# Patient Record
Sex: FEMALE | Race: WHITE | NOT HISPANIC OR LATINO | URBAN - METROPOLITAN AREA
[De-identification: names, ages, dates, MRNs, and addresses within clinical notes are randomized per-mention and may not be internally consistent; named-entity substitution may affect disease eponyms.]

---

## 2019-01-08 ENCOUNTER — INPATIENT (INPATIENT)
Facility: HOSPITAL | Age: 84
LOS: 1 days | Discharge: NURSING, REHAB OR LONG-TERM CARE | End: 2019-01-10
Attending: SURGERY | Admitting: SURGERY
Payer: MEDICARE

## 2019-01-08 VITALS
OXYGEN SATURATION: 97 % | HEART RATE: 74 BPM | SYSTOLIC BLOOD PRESSURE: 180 MMHG | DIASTOLIC BLOOD PRESSURE: 78 MMHG | RESPIRATION RATE: 18 BRPM | TEMPERATURE: 98 F

## 2019-01-08 LAB
ALBUMIN SERPL ELPH-MCNC: 4.5 G/DL — SIGNIFICANT CHANGE UP (ref 3.5–5.2)
ALP SERPL-CCNC: 84 U/L — SIGNIFICANT CHANGE UP (ref 30–115)
ALT FLD-CCNC: 26 U/L — SIGNIFICANT CHANGE UP (ref 0–41)
ANION GAP SERPL CALC-SCNC: 19 MMOL/L — HIGH (ref 7–14)
APTT BLD: 31 SEC — SIGNIFICANT CHANGE UP (ref 27–39.2)
AST SERPL-CCNC: 43 U/L — HIGH (ref 0–41)
BASOPHILS # BLD AUTO: 0.04 K/UL — SIGNIFICANT CHANGE UP (ref 0–0.2)
BASOPHILS NFR BLD AUTO: 0.5 % — SIGNIFICANT CHANGE UP (ref 0–1)
BILIRUB SERPL-MCNC: 0.5 MG/DL — SIGNIFICANT CHANGE UP (ref 0.2–1.2)
BLD GP AB SCN SERPL QL: SIGNIFICANT CHANGE UP
BUN SERPL-MCNC: 32 MG/DL — HIGH (ref 10–20)
CALCIUM SERPL-MCNC: 9.3 MG/DL — SIGNIFICANT CHANGE UP (ref 8.5–10.1)
CHLORIDE SERPL-SCNC: 103 MMOL/L — SIGNIFICANT CHANGE UP (ref 98–110)
CK SERPL-CCNC: 62 U/L — SIGNIFICANT CHANGE UP (ref 0–225)
CO2 SERPL-SCNC: 19 MMOL/L — SIGNIFICANT CHANGE UP (ref 17–32)
CREAT SERPL-MCNC: 1.6 MG/DL — HIGH (ref 0.7–1.5)
EOSINOPHIL # BLD AUTO: 0.16 K/UL — SIGNIFICANT CHANGE UP (ref 0–0.7)
EOSINOPHIL NFR BLD AUTO: 2.1 % — SIGNIFICANT CHANGE UP (ref 0–8)
ETHANOL SERPL-MCNC: <10 MG/DL — HIGH
GLUCOSE SERPL-MCNC: 134 MG/DL — HIGH (ref 70–99)
HCT VFR BLD CALC: 36.1 % — LOW (ref 37–47)
HGB BLD-MCNC: 11.9 G/DL — LOW (ref 12–16)
IMM GRANULOCYTES NFR BLD AUTO: 1.3 % — HIGH (ref 0.1–0.3)
INR BLD: 1.25 RATIO — SIGNIFICANT CHANGE UP (ref 0.65–1.3)
LACTATE SERPL-SCNC: 1.6 MMOL/L — SIGNIFICANT CHANGE UP (ref 0.5–2.2)
LIDOCAIN IGE QN: 99 U/L — HIGH (ref 7–60)
LYMPHOCYTES # BLD AUTO: 0.71 K/UL — LOW (ref 1.2–3.4)
LYMPHOCYTES # BLD AUTO: 9.4 % — LOW (ref 20.5–51.1)
MCHC RBC-ENTMCNC: 28.5 PG — SIGNIFICANT CHANGE UP (ref 27–31)
MCHC RBC-ENTMCNC: 33 G/DL — SIGNIFICANT CHANGE UP (ref 32–37)
MCV RBC AUTO: 86.6 FL — SIGNIFICANT CHANGE UP (ref 81–99)
MONOCYTES # BLD AUTO: 0.51 K/UL — SIGNIFICANT CHANGE UP (ref 0.1–0.6)
MONOCYTES NFR BLD AUTO: 6.7 % — SIGNIFICANT CHANGE UP (ref 1.7–9.3)
NEUTROPHILS # BLD AUTO: 6.06 K/UL — SIGNIFICANT CHANGE UP (ref 1.4–6.5)
NEUTROPHILS NFR BLD AUTO: 80 % — HIGH (ref 42.2–75.2)
NRBC # BLD: 0 /100 WBCS — SIGNIFICANT CHANGE UP (ref 0–0)
PLATELET # BLD AUTO: 133 K/UL — SIGNIFICANT CHANGE UP (ref 130–400)
POTASSIUM SERPL-MCNC: 4.7 MMOL/L — SIGNIFICANT CHANGE UP (ref 3.5–5)
POTASSIUM SERPL-SCNC: 4.7 MMOL/L — SIGNIFICANT CHANGE UP (ref 3.5–5)
PROT SERPL-MCNC: 7.1 G/DL — SIGNIFICANT CHANGE UP (ref 6–8)
PROTHROM AB SERPL-ACNC: 14.3 SEC — HIGH (ref 9.95–12.87)
RBC # BLD: 4.17 M/UL — LOW (ref 4.2–5.4)
RBC # FLD: 15.3 % — HIGH (ref 11.5–14.5)
SODIUM SERPL-SCNC: 141 MMOL/L — SIGNIFICANT CHANGE UP (ref 135–146)
TROPONIN T SERPL-MCNC: <0.01 NG/ML — SIGNIFICANT CHANGE UP
TYPE + AB SCN PNL BLD: SIGNIFICANT CHANGE UP
WBC # BLD: 7.58 K/UL — SIGNIFICANT CHANGE UP (ref 4.8–10.8)
WBC # FLD AUTO: 7.58 K/UL — SIGNIFICANT CHANGE UP (ref 4.8–10.8)

## 2019-01-08 RX ORDER — SODIUM CHLORIDE 9 MG/ML
1000 INJECTION INTRAMUSCULAR; INTRAVENOUS; SUBCUTANEOUS
Qty: 0 | Refills: 0 | Status: DISCONTINUED | OUTPATIENT
Start: 2019-01-08 | End: 2019-01-09

## 2019-01-08 RX ORDER — SODIUM CHLORIDE 9 MG/ML
250 INJECTION INTRAMUSCULAR; INTRAVENOUS; SUBCUTANEOUS ONCE
Qty: 0 | Refills: 0 | Status: COMPLETED | OUTPATIENT
Start: 2019-01-08 | End: 2019-01-08

## 2019-01-08 RX ORDER — OXYCODONE HYDROCHLORIDE 5 MG/1
5 TABLET ORAL EVERY 6 HOURS
Qty: 0 | Refills: 0 | Status: DISCONTINUED | OUTPATIENT
Start: 2019-01-08 | End: 2019-01-10

## 2019-01-08 RX ORDER — MORPHINE SULFATE 50 MG/1
1 CAPSULE, EXTENDED RELEASE ORAL EVERY 4 HOURS
Qty: 0 | Refills: 0 | Status: DISCONTINUED | OUTPATIENT
Start: 2019-01-08 | End: 2019-01-10

## 2019-01-08 RX ORDER — ONDANSETRON 8 MG/1
4 TABLET, FILM COATED ORAL EVERY 6 HOURS
Qty: 0 | Refills: 0 | Status: DISCONTINUED | OUTPATIENT
Start: 2019-01-08 | End: 2019-01-10

## 2019-01-08 RX ORDER — TETANUS TOXOID, REDUCED DIPHTHERIA TOXOID AND ACELLULAR PERTUSSIS VACCINE, ADSORBED 5; 2.5; 8; 8; 2.5 [IU]/.5ML; [IU]/.5ML; UG/.5ML; UG/.5ML; UG/.5ML
0.5 SUSPENSION INTRAMUSCULAR ONCE
Qty: 0 | Refills: 0 | Status: COMPLETED | OUTPATIENT
Start: 2019-01-08 | End: 2019-01-08

## 2019-01-08 RX ORDER — IBUPROFEN 200 MG
400 TABLET ORAL EVERY 8 HOURS
Qty: 0 | Refills: 0 | Status: DISCONTINUED | OUTPATIENT
Start: 2019-01-08 | End: 2019-01-10

## 2019-01-08 RX ORDER — ACETAMINOPHEN 500 MG
650 TABLET ORAL EVERY 6 HOURS
Qty: 0 | Refills: 0 | Status: DISCONTINUED | OUTPATIENT
Start: 2019-01-08 | End: 2019-01-10

## 2019-01-08 RX ORDER — PANTOPRAZOLE SODIUM 20 MG/1
40 TABLET, DELAYED RELEASE ORAL DAILY
Qty: 0 | Refills: 0 | Status: DISCONTINUED | OUTPATIENT
Start: 2019-01-08 | End: 2019-01-10

## 2019-01-08 RX ADMIN — TETANUS TOXOID, REDUCED DIPHTHERIA TOXOID AND ACELLULAR PERTUSSIS VACCINE, ADSORBED 0.5 MILLILITER(S): 5; 2.5; 8; 8; 2.5 SUSPENSION INTRAMUSCULAR at 19:28

## 2019-01-08 RX ADMIN — SODIUM CHLORIDE 250 MILLILITER(S): 9 INJECTION INTRAMUSCULAR; INTRAVENOUS; SUBCUTANEOUS at 19:27

## 2019-01-08 RX ADMIN — Medication 400 MILLIGRAM(S): at 21:33

## 2019-01-08 RX ADMIN — SODIUM CHLORIDE 75 MILLILITER(S): 9 INJECTION INTRAMUSCULAR; INTRAVENOUS; SUBCUTANEOUS at 21:33

## 2019-01-08 NOTE — ED PROVIDER NOTE - NS ED ROS FT
Constitutional: No altered mental status.  Eyes: No visual changes.  ENT: No hearing loss.  Neck: No neck pain or stiffness.  Cardiovascular: No chest pain, palpitations.  Pulmonary: No SOB. No hemoptysis.  Abdominal: No abdominal pain, nausea, vomiting.   : No hematuria.  Neuro: No headache, syncope, dizziness.  MS: No back pain. No deformities.  Psych: No suicidal ideations.

## 2019-01-08 NOTE — H&P ADULT - NSHPPHYSICALEXAM_GEN_ALL_CORE
ICU Vital Signs Last 24 Hrs  T(C): 36.7 (08 Jan 2019 18:33), Max: 36.7 (08 Jan 2019 18:33)  T(F): 98 (08 Jan 2019 18:33), Max: 98 (08 Jan 2019 18:33)  HR: 81 (08 Jan 2019 19:54) (74 - 81)  BP: 147/67 (08 Jan 2019 19:54) (147/67 - 180/78)  RR: 16 (08 Jan 2019 19:54) (16 - 18)  SpO2: 99% (08 Jan 2019 19:54) (97% - 99%)    General: NAD  HEENT: Normocephalic, EOMI, PEERLA. scalp laceration, posterior.    Neck: Soft, midline trachea. no cspine tenderness  Chest: No chest wall tenderness. or subq  emphysema   Cardiac: S1, S2, RRR  Respiratory: Bilateral breath sounds, clear and equal bilaterally  Abdomen: Soft, non-distended, non-tender, no rebound,   Groin: Normal appearing, pelvis stable   Ext: palp radial b/l UE, b/l DP palp in Lower Extrem.   Back: no TTP, no palpable runoff/stepoff/deformity  Rectal: No rosendo blood, KATLYN with good tone

## 2019-01-08 NOTE — ED PROVIDER NOTE - CARE PLAN
Principal Discharge DX:	Fracture of inferior pubic ramus  Secondary Diagnosis:	Fracture of coracoid process of scapula  Secondary Diagnosis:	Hematoma of left hip  Secondary Diagnosis:	Accidental fall

## 2019-01-08 NOTE — H&P ADULT - NSHPLABSRESULTS_GEN_ALL_CORE
Labs:  CAPILLARY BLOOD GLUCOSE                        11.9   7.58  )-----------( 133      ( 08 Jan 2019 20:00 )             36.1       Auto Neutrophil %: 80.0 % (01-08-19 @ 20:00)  Auto Immature Granulocyte %: 1.3 % (01-08-19 @ 20:00)    01-08    141  |  103  |  32<H>  ----------------------------<  134<H>  4.7   |  19  |  1.6<H>      Calcium, Total Serum: 9.3 mg/dL (01-08-19 @ 19:30)      LFTs:             7.1  | 0.5  | 43       ------------------[84      ( 08 Jan 2019 19:30 )  4.5  | x    | 26          Lipase:99     Amylase:x         Lactate, Blood: 1.6 mmol/L (01-08-19 @ 19:30)      Coags:     14.30  ----< 1.25    ( 08 Jan 2019 20:00 )     31.0        CARDIAC MARKERS ( 08 Jan 2019 19:30 )  x     / <0.01 ng/mL / 62 U/L / x     / x        RADIOLOGY:    < from: CT Cervical Spine No Cont (01.08.19 @ 19:20) >    Impression:    No CT evidence of acute cervical injury.    Degenerative changes as above.    < end of copied text >    < from: CT Head No Cont (01.08.19 @ 19:30) >    Impression:     No CT evidence of acute intracranial pathology.    < end of copied text >    < from: CT Chest w/ IV Cont (01.08.19 @ 19:30) >    IMPRESSION:     1.  Nondisplaced fracture of the left coracoid    2.  Nondisplaced fracture of the left inferior pubic ramus.    3.  Left hip subcutaneous hematoma measuring 4 cm with active   extravasation.    4.  Multiple bilateral subcentimeter pulmonary nodules limited in   evaluation due to respiratory motion. When the patient is able, chest CT   is recommended for further evaluation.    5.  Cirrhosis with portal hypertension.    6.  Dilatation of the right femoral vein with contrast enhancement, may   reflect an AV fistula.    Dr. Blue(y) was made aware of theabove findings on January 8, 2019 at   8:36 PM with read back      < end of copied text >

## 2019-01-08 NOTE — ED PROVIDER NOTE - OBJECTIVE STATEMENT
Patient is an 89 yo F w/ hx of a-fib on coumadin, HTN, colon cancer in remission for 20 yrs, Hep C (cured), pacemaker p/w trip and fall down 15 flights of stairs. Patient had head injury, no LOC. Needed help getting up from stairs but ambulatory on scene. No headache, no dizziness, no changes in vision, no neck pain, no chest pain, SOB, extremity pain.

## 2019-01-08 NOTE — ED PROVIDER NOTE - MEDICAL DECISION MAKING DETAILS
Chart finished.  I personally evaluated the patient. I reviewed the Resident’s or Physician Assistant’s note (as assigned above), and agree with the findings and plan except as documented in my note.  Patient is an 89 yo F w/ hx of a-fib on coumadin, HTN, colon cancer in remission for 20 yrs, Hep C (cured), pacemaker p/w trip and fall down 15 flights of stairs. Patient had head injury, no LOC.  Trauma alert called.  Pan CT scan showed pelvic fracture and coracoid fracture.  Will admit to trauma service.

## 2019-01-08 NOTE — H&P ADULT - PMH
Acute cerebrovascular accident (CVA)    AF (atrial fibrillation)    HLD (hyperlipidemia)    HTN (hypertension)

## 2019-01-08 NOTE — ED ADULT NURSE NOTE - NSIMPLEMENTINTERV_GEN_ALL_ED
Implemented All Fall Risk Interventions:  [place] to call system. Call bell, personal items and telephone within reach. Instruct patient to call for assistance. Room bathroom lighting operational. Non-slip footwear when patient is off stretcher. Physically safe environment: no spills, clutter or unnecessary equipment. Stretcher in lowest position, wheels locked, appropriate side rails in place. Provide visual cue, wrist band, yellow gown, etc. Monitor gait and stability. Monitor for mental status changes and reorient to person, place, and time. Review medications for side effects contributing to fall risk. Reinforce activity limits and safety measures with patient and family.

## 2019-01-08 NOTE — H&P ADULT - HISTORY OF PRESENT ILLNESS
88y old female brought in by EMS after being found by her daughter s/p fall down ~15 steps; +HT, -LOC, +AC (coumadin).  Patient lives in [place], has been staying with her daughter-in-law for the holidays, she is unfamiliar with the home.   Daughter-in-law did not witness fall but heard the impact and a shout.  GCS 15.  Patient has small laceration to posterior scalp, primary survey is negative otherwise.  FAST negative. Patient denies nausea/vomiting, denies LOC, denies chest pain or SOB.

## 2019-01-08 NOTE — H&P ADULT - ASSESSMENT
88y old f s/p fall down stairs +HT, +AC (a-fib on coumadin), -LOC found to have left coracoid fracture nondisplaced, and left inferior pubic rami fracture nondisplaced.    PLAN:    - hold all AC, no HSQ  - Pan scan  - FAST  - NPO  - labs, type and screen  - Ortho consult  - serial CBC given subcutaneous hematoma with extravasation  -  d/w attending

## 2019-01-08 NOTE — CONSULT NOTE ADULT - ASSESSMENT
ASSESSMENT:  88y old f s/p    PLAN:    - Pan scan  - FAST  - NPO  - labs, type and screen    -  d/w ASSESSMENT:  88y old f s/p    PLAN:    - Pan scan  - FAST  - NPO  - labs, type and screen    -  d/w     Senior Note  I have personally examined and evaluated the patient  I agree with the above plan and note  Surgical Attending aware and agrees with plan ASSESSMENT:  88y old f s/p fall down stairs +HT, +ASA, -LOC found to have     PLAN:    - Pan scan  - FAST  - NPO  - labs, type and screen    -  d/w     Senior Note  I have personally examined and evaluated the patient  I agree with the above plan and note  Surgical Attending aware and agrees with plan ASSESSMENT:  88y old f s/p fall down stairs +HT, +ASA, -LOC found to have     PLAN:    - hold all AC, no HSQ  - Pan scan  - FAST  - NPO  - labs, type and screen  - Ortho consult  - serial CBC given subcutaneous hematoma with extravasation  -  d/w attending    Senior Note  I have personally examined and evaluated the patient  I agree with the above plan and note  Surgical Attending aware and agrees with plan ASSESSMENT:  88y old f s/p fall down stairs +HT, +AC (a-fib on coumadin), -LOC found to have left coracoid fracture nondisplaced, and left inferior pubic rami fracture nondisplaced.    PLAN:    - hold all AC, no HSQ  - Pan scan  - FAST  - NPO  - labs, type and screen  - Ortho consult  - serial CBC given subcutaneous hematoma with extravasation  -  d/w attending    Senior Note  I have personally examined and evaluated the patient  I agree with the above plan and note  Surgical Attending aware and agrees with plan

## 2019-01-08 NOTE — ED PROVIDER NOTE - ATTENDING CONTRIBUTION TO CARE
I personally evaluated the patient. I reviewed the Resident’s or Physician Assistant’s note (as assigned above), and agree with the findings and plan except as documented in my note.  Patient is an 87 yo F w/ hx of a-fib on coumadin, HTN, colon cancer in remission for 20 yrs, Hep C (cured), pacemaker p/w trip and fall down 15 flights of stairs. Patient had head injury, no LOC. Needed help getting up from stairs but ambulatory on scene. No headache, no dizziness, no changes in vision, no neck pain, no chest pain, SOB, extremity pain.  Trauma alert called.  Pan CT scan showed pelvic fracture and coracoid fracture.  Will admit to trauma service.

## 2019-01-08 NOTE — ED ADULT NURSE NOTE - OBJECTIVE STATEMENT
Pt slipped and fell down 15 steps, complaining of pain to the back of her head. denies LOC. + blood thinners. trauma alert called.

## 2019-01-08 NOTE — ED PROVIDER NOTE - PHYSICAL EXAMINATION
Constitutional: Well developed, well nourished. NAD  TRAUMA: ABC intact. GCS 15. FAST negative. In C-collar.   Head: Normocephalic, 1.5 cm laceration of the left occiput; not actively bleeding.   Eyes: PERRL. EOMI. No Raccoon eyes.   ENT: No nasal discharge. No septal hematoma. No Schwab sign. Mucous membranes moist.  Neck: Supple. Painless ROM. No midline tenderness, stepoffs.  Cardiovascular: Normal S1, S2. Regular rate and rhythm. No murmurs, rubs, or gallops.  Pulmonary: Normal respiratory rate and effort. Lungs clear to auscultation bilaterally. No wheezing, rales, or rhonchi.  CHEST: No chest wall tenderness, crepitus.  Abdominal: Soft. Nondistended. Nontender. No rebound, guarding, rigidity.  BACK: No midline T/L/S tenderness, stepoffs. No saddle paresthesia.  Extremities. Pelvis stable. No traumatic deformities, tenderness of extremities.  Skin: 1.5 cm laceration to the left occiput.   Neuro: AAOx3. Strength 5/5 in all extremities. Sensation intact throughout. No focal neurological deficits.  Psych: Normal mood. Normal affect.

## 2019-01-08 NOTE — ED ADULT TRIAGE NOTE - CHIEF COMPLAINT QUOTE
BIBA from home for fall from 15 steps, no LOC, hitting head, with laceration on coumadin. Trauma alert activated

## 2019-01-08 NOTE — CONSULT NOTE ADULT - SUBJECTIVE AND OBJECTIVE BOX
88y f  TRAUMA ACTIVATION LEVEL:      MECHANISM OF INJURY:      [] Blunt  	[] MVC	[x] Fall	[] Pedestrian Struck	[] Motorcycle   [] Assault   [] Bicycle collision  [] Sports injury     [] Penetrating  	[] Gun Shot Wound 		[] Stab Wound    GCS: 15 	E: 4	V: 5	M: 6      HPI:  88y old female brought in by EMS after being found by her daughter s/p fall down ~15 steps; +HT, -LOC, +AC (coumadin).  Patient lives in [place], has been staying with her daughter-in-law for the holidays, she is unfamiliar with the home.   Daughter-in-law did not witness fall but heard the impact and a shout,  Patient has small laceration to posterior scalp, primary survey is negative otherwise.  FAST negative. Patient denies nausea/vomiting, denies LOC, denies chest pain or SOB.      PAST MEDICAL & SURGICAL HISTORY:      Allergies    No Known Allergies    Intolerances        Home Medications:      ROS: 10-system review is otherwise negative except HPI above.      Primary Survey:    A - airway intact  B - bilateral breath sounds and good chest rise  C - palpable pulses in all extremities  D - GCS 15 on arrival, DOMINGUEZ  Exposure obtained    Vital Signs Last 24 Hrs  T(C): 36.7 (08 Jan 2019 18:33), Max: 36.7 (08 Jan 2019 18:33)  T(F): 98 (08 Jan 2019 18:33), Max: 98 (08 Jan 2019 18:33)  HR: 74 (08 Jan 2019 18:33) (74 - 74)  BP: 180/78 (08 Jan 2019 18:33) (180/78 - 180/78)  BP(mean): --  RR: 18 (08 Jan 2019 18:33) (18 - 18)  SpO2: 97% (08 Jan 2019 18:33) (97% - 97%)    Secondary Survey:   General: NAD  HEENT: Normocephalic, atraumatic, EOMI, PEERLA. no scalp lacerations   Neck: Soft, midline trachea. no cspine tenderness  Chest: No chest wall tenderness. or subq  emphysema   Cardiac: S1, S2, RRR  Respiratory: Bilateral breath sounds, clear and equal bilaterally  Abdomen: Soft, non-distended, non-tender, no rebound,   Groin: Normal appearing, pelvis stable   Ext: palp radial b/l UE, b/l DP palp in Lower Extrem.   Back: no TTP, no palpable runoff/stepoff/deformity  Rectal: No rosendo blood, KATLYN with good tone    FAST    Procedures:    LABS:  Labs:  CAPILLARY BLOOD GLUCOSE                    RADIOLOGY & ADDITIONAL STUDIES:      --------------------------------------------------------------------------------------- 88y f  TRAUMA ACTIVATION LEVEL:      MECHANISM OF INJURY:      [] Blunt  	[] MVC	[x] Fall	[] Pedestrian Struck	[] Motorcycle   [] Assault   [] Bicycle collision  [] Sports injury     [] Penetrating  	[] Gun Shot Wound 		[] Stab Wound    GCS: 15 	E: 4	V: 5	M: 6      HPI:  88y old female brought in by EMS after being found by her daughter s/p fall down ~15 steps; +HT, -LOC, +AC (coumadin).  Patient lives in [place], has been staying with her daughter-in-law for the holidays, she is unfamiliar with the home.   Daughter-in-law did not witness fall but heard the impact and a shout,  Patient has small laceration to posterior scalp, primary survey is negative otherwise.  FAST negative. Patient denies nausea/vomiting, denies LOC, denies chest pain or SOB.      PAST MEDICAL & SURGICAL HISTORY:      Allergies    No Known Allergies    Intolerances      Home Medications:      ROS: 10-system review is otherwise negative except HPI above.      Primary Survey:    A - airway intact  B - bilateral breath sounds and good chest rise  C - palpable pulses in all extremities  D - GCS 15 on arrival, DOMINGUEZ  Exposure obtained    Vital Signs Last 24 Hrs  T(C): 36.7 (08 Jan 2019 18:33), Max: 36.7 (08 Jan 2019 18:33)  T(F): 98 (08 Jan 2019 18:33), Max: 98 (08 Jan 2019 18:33)  HR: 74 (08 Jan 2019 18:33) (74 - 74)  BP: 180/78 (08 Jan 2019 18:33) (180/78 - 180/78)  BP(mean): --  RR: 18 (08 Jan 2019 18:33) (18 - 18)  SpO2: 97% (08 Jan 2019 18:33) (97% - 97%)    Secondary Survey:   General: NAD  HEENT: Normocephalic, atraumatic, EOMI, PEERLA. no scalp lacerations   Neck: Soft, midline trachea. no cspine tenderness  Chest: No chest wall tenderness. or subq  emphysema   Cardiac: S1, S2, RRR  Respiratory: Bilateral breath sounds, clear and equal bilaterally  Abdomen: Soft, non-distended, non-tender, no rebound,   Groin: Normal appearing, pelvis stable   Ext: palp radial b/l UE, b/l DP palp in Lower Extrem.   Back: no TTP, no palpable runoff/stepoff/deformity  Rectal: No rosendo blood, KATLYN with good tone    FAST    Procedures:    LABS:  Labs:  CAPILLARY BLOOD GLUCOSE              RADIOLOGY & ADDITIONAL STUDIES:      --------------------------------------------------------------------------------------- 88y f  TRAUMA ACTIVATION LEVEL:      MECHANISM OF INJURY:      [] Blunt  	[] MVC	[x] Fall	[] Pedestrian Struck	[] Motorcycle   [] Assault   [] Bicycle collision  [] Sports injury     [] Penetrating  	[] Gun Shot Wound 		[] Stab Wound    GCS: 15 	E: 4	V: 5	M: 6      HPI:  88y old female brought in by EMS after being found by her daughter s/p fall down ~15 steps; +HT, -LOC, +AC (coumadin).  Patient lives in [place], has been staying with her daughter-in-law for the holidays, she is unfamiliar with the home.   Daughter-in-law did not witness fall but heard the impact and a shout,  Patient has small laceration to posterior scalp, primary survey is negative otherwise.  FAST negative. Patient denies nausea/vomiting, denies LOC, denies chest pain or SOB.      PAST MEDICAL & SURGICAL HISTORY:      Allergies    No Known Allergies    Intolerances      Home Medications:      ROS: 10-system review is otherwise negative except HPI above.      Primary Survey:    A - airway intact  B - bilateral breath sounds and good chest rise  C - palpable pulses in all extremities  D - GCS 15 on arrival, DOMINGUEZ  Exposure obtained    Vital Signs Last 24 Hrs  T(C): 36.7 (08 Jan 2019 18:33), Max: 36.7 (08 Jan 2019 18:33)  T(F): 98 (08 Jan 2019 18:33), Max: 98 (08 Jan 2019 18:33)  HR: 74 (08 Jan 2019 18:33) (74 - 74)  BP: 180/78 (08 Jan 2019 18:33) (180/78 - 180/78)  BP(mean): --  RR: 18 (08 Jan 2019 18:33) (18 - 18)  SpO2: 97% (08 Jan 2019 18:33) (97% - 97%)    Secondary Survey:   General: NAD  HEENT: Normocephalic, atraumatic, EOMI, PEERLA. no scalp lacerations   Neck: Soft, midline trachea. no cspine tenderness  Chest: No chest wall tenderness. or subq  emphysema   Cardiac: S1, S2, RRR  Respiratory: Bilateral breath sounds, clear and equal bilaterally  Abdomen: Soft, non-distended, non-tender, no rebound,   Groin: Normal appearing, pelvis stable   Ext: palp radial b/l UE, b/l DP palp in Lower Extrem.   Back: no TTP, no palpable runoff/stepoff/deformity  Rectal: No rosendo blood, KATLYN with good tone    FAST    Procedures:    LABS:  Labs:  CAPILLARY BLOOD GLUCOSE              RADIOLOGY & ADDITIONAL STUDIES:  CT CHEST/A/P  IMPRESSION:     1.  Nondisplaced fracture of the left coracoid    2.  Nondisplaced fracture of the left inferior pubic ramus.    3.  Left hip subcutaneous hematoma measuring 4 cm with active   extravasation.    4.  Multiple bilateral subcentimeter pulmonary nodules limited in   evaluation due to respiratory motion. When the patient is able, chest CT   is recommended for further evaluation.    5.  Cirrhosis with portal hypertension.    6.  Dilatation of the right femoral vein with contrast enhancement, may   reflect an AV fistula.    Dr. Blue(y) was made aware of theabove findings on January 8, 2019 at   8:36 PM with read back    < from: CT Head No Cont (01.08.19 @ 19:30) >  Impression:     No CT evidence of acute intracranial pathology.        < end of copied text >        < from: CT Cervical Spine No Cont (01.08.19 @ 19:20) >    Impression:    No CT evidence of acute cervical injury.    Degenerative changes as above.          < end of copied text >      --------------------------------------------------------------------------------------- 88y f  TRAUMA ACTIVATION LEVEL:      MECHANISM OF INJURY:      [] Blunt  	[] MVC	[x] Fall	[] Pedestrian Struck	[] Motorcycle   [] Assault   [] Bicycle collision  [] Sports injury     [] Penetrating  	[] Gun Shot Wound 		[] Stab Wound    GCS: 15 	E: 4	V: 5	M: 6      HPI:  88y old female brought in by EMS after being found by her daughter s/p fall down ~15 steps; +HT, -LOC, +AC (coumadin).  Patient lives in [place], has been staying with her daughter-in-law for the holidays, she is unfamiliar with the home.   Daughter-in-law did not witness fall but heard the impact and a shout.  GCS 15.  Patient has small laceration to posterior scalp, primary survey is negative otherwise.  FAST negative. Patient denies nausea/vomiting, denies LOC, denies chest pain or SOB.      PAST MEDICAL & SURGICAL HISTORY:  pacemaker  HTN  HLD  CVA    Allergies  Codeine  Iodine  Penicillin     Intolerances      Home Medications:      ROS: 10-system review is otherwise negative except HPI above.      Primary Survey:    A - airway intact  B - bilateral breath sounds and good chest rise  C - palpable pulses in all extremities  D - GCS 15 on arrival, DOMINGUEZ  Exposure obtained    Vital Signs Last 24 Hrs  T(C): 36.7 (08 Jan 2019 18:33), Max: 36.7 (08 Jan 2019 18:33)  T(F): 98 (08 Jan 2019 18:33), Max: 98 (08 Jan 2019 18:33)  HR: 74 (08 Jan 2019 18:33) (74 - 74)  BP: 180/78 (08 Jan 2019 18:33) (180/78 - 180/78)  RR: 18 (08 Jan 2019 18:33) (18 - 18)  SpO2: 97% (08 Jan 2019 18:33) (97% - 97%)    Secondary Survey:   General: NAD  HEENT: Normocephalic, EOMI, PEERLA. scalp laceration, posterior.    Neck: Soft, midline trachea. no cspine tenderness  Chest: No chest wall tenderness. or subq  emphysema   Cardiac: S1, S2, RRR  Respiratory: Bilateral breath sounds, clear and equal bilaterally  Abdomen: Soft, non-distended, non-tender, no rebound,   Groin: Normal appearing, pelvis stable   Ext: palp radial b/l UE, b/l DP palp in Lower Extrem.   Back: no TTP, no palpable runoff/stepoff/deformity  Rectal: No rosendo blood, KATLYN with good tone    FAST    Procedures:    LABS:  Labs:  CAPILLARY BLOOD GLUCOSE        RADIOLOGY & ADDITIONAL STUDIES:  CT CHEST/A/P  IMPRESSION:     1.  Nondisplaced fracture of the left coracoid    2.  Nondisplaced fracture of the left inferior pubic ramus.    3.  Left hip subcutaneous hematoma measuring 4 cm with active   extravasation.    4.  Multiple bilateral subcentimeter pulmonary nodules limited in   evaluation due to respiratory motion. When the patient is able, chest CT   is recommended for further evaluation.    5.  Cirrhosis with portal hypertension.    6.  Dilatation of the right femoral vein with contrast enhancement, may   reflect an AV fistula.    Dr. Blue(y) was made aware of theabove findings on January 8, 2019 at   8:36 PM with read back    < from: CT Head No Cont (01.08.19 @ 19:30) >  Impression:     No CT evidence of acute intracranial pathology.        < end of copied text >        < from: CT Cervical Spine No Cont (01.08.19 @ 19:20) >    Impression:    No CT evidence of acute cervical injury.    Degenerative changes as above.          < end of copied text >    < from: CT Head No Cont (01.08.19 @ 19:30) >  Impression:     No CT evidence of acute intracranial pathology.      < end of copied text >    ---------------------------------------------------------------------------------------

## 2019-01-09 LAB
HCT VFR BLD CALC: 34 % — LOW (ref 37–47)
HGB BLD-MCNC: 11.3 G/DL — LOW (ref 12–16)
MCHC RBC-ENTMCNC: 29 PG — SIGNIFICANT CHANGE UP (ref 27–31)
MCHC RBC-ENTMCNC: 33.2 G/DL — SIGNIFICANT CHANGE UP (ref 32–37)
MCV RBC AUTO: 87.2 FL — SIGNIFICANT CHANGE UP (ref 81–99)
NRBC # BLD: 0 /100 WBCS — SIGNIFICANT CHANGE UP (ref 0–0)
PLATELET # BLD AUTO: 140 K/UL — SIGNIFICANT CHANGE UP (ref 130–400)
RBC # BLD: 3.9 M/UL — LOW (ref 4.2–5.4)
RBC # FLD: 14.8 % — HIGH (ref 11.5–14.5)
WBC # BLD: 6.63 K/UL — SIGNIFICANT CHANGE UP (ref 4.8–10.8)
WBC # FLD AUTO: 6.63 K/UL — SIGNIFICANT CHANGE UP (ref 4.8–10.8)

## 2019-01-09 PROCEDURE — 99223 1ST HOSP IP/OBS HIGH 75: CPT

## 2019-01-09 RX ORDER — METOPROLOL TARTRATE 50 MG
100 TABLET ORAL ONCE
Qty: 0 | Refills: 0 | Status: COMPLETED | OUTPATIENT
Start: 2019-01-09 | End: 2019-01-09

## 2019-01-09 RX ORDER — SENNA PLUS 8.6 MG/1
1 TABLET ORAL AT BEDTIME
Qty: 0 | Refills: 0 | Status: DISCONTINUED | OUTPATIENT
Start: 2019-01-09 | End: 2019-01-10

## 2019-01-09 RX ORDER — POLYETHYLENE GLYCOL 3350 17 G/17G
17 POWDER, FOR SOLUTION ORAL DAILY
Qty: 0 | Refills: 0 | Status: DISCONTINUED | OUTPATIENT
Start: 2019-01-09 | End: 2019-01-10

## 2019-01-09 RX ORDER — METOPROLOL TARTRATE 50 MG
100 TABLET ORAL
Qty: 0 | Refills: 0 | Status: DISCONTINUED | OUTPATIENT
Start: 2019-01-09 | End: 2019-01-10

## 2019-01-09 RX ORDER — FERROUS SULFATE 325(65) MG
325 TABLET ORAL DAILY
Qty: 0 | Refills: 0 | Status: DISCONTINUED | OUTPATIENT
Start: 2019-01-09 | End: 2019-01-10

## 2019-01-09 RX ORDER — DOCUSATE SODIUM 100 MG
100 CAPSULE ORAL
Qty: 0 | Refills: 0 | Status: DISCONTINUED | OUTPATIENT
Start: 2019-01-09 | End: 2019-01-10

## 2019-01-09 RX ORDER — CHOLECALCIFEROL (VITAMIN D3) 125 MCG
5000 CAPSULE ORAL
Qty: 0 | Refills: 0 | Status: DISCONTINUED | OUTPATIENT
Start: 2019-01-09 | End: 2019-01-10

## 2019-01-09 RX ORDER — MIRTAZAPINE 45 MG/1
15 TABLET, ORALLY DISINTEGRATING ORAL AT BEDTIME
Qty: 0 | Refills: 0 | Status: DISCONTINUED | OUTPATIENT
Start: 2019-01-09 | End: 2019-01-10

## 2019-01-09 RX ORDER — HYDRALAZINE HCL 50 MG
10 TABLET ORAL THREE TIMES A DAY
Qty: 0 | Refills: 0 | Status: DISCONTINUED | OUTPATIENT
Start: 2019-01-09 | End: 2019-01-10

## 2019-01-09 RX ORDER — LOSARTAN POTASSIUM 100 MG/1
100 TABLET, FILM COATED ORAL DAILY
Qty: 0 | Refills: 0 | Status: DISCONTINUED | OUTPATIENT
Start: 2019-01-09 | End: 2019-01-10

## 2019-01-09 RX ORDER — WARFARIN SODIUM 2.5 MG/1
5 TABLET ORAL ONCE
Qty: 0 | Refills: 0 | Status: COMPLETED | OUTPATIENT
Start: 2019-01-09 | End: 2019-01-09

## 2019-01-09 RX ORDER — MIRTAZAPINE 45 MG/1
15 TABLET, ORALLY DISINTEGRATING ORAL ONCE
Qty: 0 | Refills: 0 | Status: COMPLETED | OUTPATIENT
Start: 2019-01-09 | End: 2019-01-09

## 2019-01-09 RX ORDER — HYDRALAZINE HCL 50 MG
10 TABLET ORAL ONCE
Qty: 0 | Refills: 0 | Status: COMPLETED | OUTPATIENT
Start: 2019-01-09 | End: 2019-01-09

## 2019-01-09 RX ORDER — AMLODIPINE BESYLATE 2.5 MG/1
5 TABLET ORAL DAILY
Qty: 0 | Refills: 0 | Status: DISCONTINUED | OUTPATIENT
Start: 2019-01-09 | End: 2019-01-10

## 2019-01-09 RX ADMIN — Medication 400 MILLIGRAM(S): at 22:09

## 2019-01-09 RX ADMIN — Medication 650 MILLIGRAM(S): at 13:14

## 2019-01-09 RX ADMIN — Medication 650 MILLIGRAM(S): at 06:01

## 2019-01-09 RX ADMIN — OXYCODONE HYDROCHLORIDE 5 MILLIGRAM(S): 5 TABLET ORAL at 06:11

## 2019-01-09 RX ADMIN — Medication 650 MILLIGRAM(S): at 23:39

## 2019-01-09 RX ADMIN — WARFARIN SODIUM 5 MILLIGRAM(S): 2.5 TABLET ORAL at 22:09

## 2019-01-09 RX ADMIN — Medication 400 MILLIGRAM(S): at 07:00

## 2019-01-09 RX ADMIN — MIRTAZAPINE 15 MILLIGRAM(S): 45 TABLET, ORALLY DISINTEGRATING ORAL at 23:39

## 2019-01-09 RX ADMIN — Medication 650 MILLIGRAM(S): at 18:12

## 2019-01-09 RX ADMIN — OXYCODONE HYDROCHLORIDE 5 MILLIGRAM(S): 5 TABLET ORAL at 07:00

## 2019-01-09 RX ADMIN — OXYCODONE HYDROCHLORIDE 5 MILLIGRAM(S): 5 TABLET ORAL at 00:11

## 2019-01-09 RX ADMIN — Medication 10 MILLIGRAM(S): at 23:39

## 2019-01-09 RX ADMIN — MORPHINE SULFATE 1 MILLIGRAM(S): 50 CAPSULE, EXTENDED RELEASE ORAL at 09:23

## 2019-01-09 RX ADMIN — Medication 650 MILLIGRAM(S): at 07:00

## 2019-01-09 RX ADMIN — Medication 400 MILLIGRAM(S): at 13:56

## 2019-01-09 RX ADMIN — Medication 400 MILLIGRAM(S): at 22:11

## 2019-01-09 RX ADMIN — PANTOPRAZOLE SODIUM 40 MILLIGRAM(S): 20 TABLET, DELAYED RELEASE ORAL at 13:26

## 2019-01-09 RX ADMIN — Medication 100 MILLIGRAM(S): at 18:12

## 2019-01-09 RX ADMIN — Medication 650 MILLIGRAM(S): at 01:00

## 2019-01-09 RX ADMIN — Medication 100 MILLIGRAM(S): at 23:38

## 2019-01-09 RX ADMIN — OXYCODONE HYDROCHLORIDE 5 MILLIGRAM(S): 5 TABLET ORAL at 01:00

## 2019-01-09 RX ADMIN — Medication 400 MILLIGRAM(S): at 13:26

## 2019-01-09 RX ADMIN — Medication 650 MILLIGRAM(S): at 18:42

## 2019-01-09 RX ADMIN — MORPHINE SULFATE 1 MILLIGRAM(S): 50 CAPSULE, EXTENDED RELEASE ORAL at 09:08

## 2019-01-09 RX ADMIN — Medication 400 MILLIGRAM(S): at 06:01

## 2019-01-09 RX ADMIN — SENNA PLUS 1 TABLET(S): 8.6 TABLET ORAL at 22:09

## 2019-01-09 RX ADMIN — Medication 650 MILLIGRAM(S): at 00:10

## 2019-01-09 RX ADMIN — Medication 650 MILLIGRAM(S): at 12:44

## 2019-01-09 NOTE — CONSULT NOTE ADULT - ATTENDING COMMENTS
Pt. seen/ examined on 4A  Transferred to rehab 4A  No other injuries  Able to transfer w/ some pain in L groin  XR reviewed  No acute ortho intervention  F/up XR in 2 weeks
s/p fall   doing well   rehab evaluation   SS eval   discharge planning

## 2019-01-09 NOTE — CONSULT NOTE ADULT - SUBJECTIVE AND OBJECTIVE BOX
HPI:  88y old female brought in by EMS after being found by her daughter s/p fall down ~15 steps; +HT, -LOC, +AC (coumadin).  Patient lives in [place], has been staying with her daughter-in-law for the holidays, she is unfamiliar with the home.   Daughter-in-law did not witness fall but heard the impact and a shout.  GCS 15.  Patient has small laceration to posterior scalp, primary survey is negative otherwise.  FAST negative. Patient denies nausea/vomiting, denies LOC, denies chest pain or SOB. (08 Jan 2019 21:44). Trauma w/u + for left pelvic fx, wbat as per ortho.      PAST MEDICAL & SURGICAL HISTORY:  AF (atrial fibrillation)  HLD (hyperlipidemia)  HTN (hypertension)  Acute cerebrovascular accident (CVA)      Hospital Course:    TODAY'S SUBJECTIVE & REVIEW OF SYMPTOMS:     Constitutional WNL   Cardio WNL   Resp WNL   GI WNL  Heme WNL  Endo WNL  Skin WNL  MSK pain  Neuro WNL  Cognitive WNL  Psych WNL      MEDICATIONS  (STANDING):  acetaminophen   Tablet .. 650 milliGRAM(s) Oral every 6 hours  docusate sodium 100 milliGRAM(s) Oral two times a day  ibuprofen  Tablet. 400 milliGRAM(s) Oral every 8 hours  pantoprazole  Injectable 40 milliGRAM(s) IV Push daily  senna 1 Tablet(s) Oral at bedtime    MEDICATIONS  (PRN):  morphine  - Injectable 1 milliGRAM(s) IV Push every 4 hours PRN Severe Pain (7 - 10)  ondansetron Injectable 4 milliGRAM(s) IV Push every 6 hours PRN Nausea  oxyCODONE    IR 5 milliGRAM(s) Oral every 6 hours PRN Moderate Pain (4 - 6)      FAMILY HISTORY:      Allergies    No Known Allergies    Intolerances        SOCIAL HISTORY:    [  ] Etoh  [  ] Smoking  [  ] Substance abuse     Home Environment:  [  ] Home Alone  [ x ] Lives with Family  [  ] Home Health Aid    Dwelling:  [  ] Apartment  [x  ] Private House  [  ] Adult Home  [  ] Skilled Nursing Facility      [  ] Short Term  [  ] Long Term  [x  ] Stairs       Elevator [  ]    FUNCTIONAL STATUS PTA: (Check all that apply)  Ambulation: [ x  ]Independent    [  ] Dependent     [  ] Non-Ambulatory  Assistive Device: [  ] SA Cane  [  ]  Q Cane  [  ] Walker  [  ]  Wheelchair  ADL : [ x ] Independent  [  ]  Dependent       Vital Signs Last 24 Hrs  T(C): 36.2 (09 Jan 2019 05:45), Max: 36.7 (08 Jan 2019 18:33)  T(F): 97.1 (09 Jan 2019 05:45), Max: 98 (08 Jan 2019 18:33)  HR: 69 (09 Jan 2019 05:45) (69 - 89)  BP: 146/65 (09 Jan 2019 05:45) (146/65 - 180/78)  BP(mean): 105 (09 Jan 2019 00:10) (105 - 105)  RR: 17 (09 Jan 2019 05:45) (16 - 18)  SpO2: 98% (09 Jan 2019 00:10) (97% - 99%)      PHYSICAL EXAM: Alert & Oriented X3  GENERAL: NAD, well-groomed, well-developed  HEAD:  Atraumatic, Normocephalic  CHEST/LUNG: Clear   HEART: S1S2+  ABDOMEN: Soft, Nontender  EXTREMITIES:  no calf tenderness    NERVOUS SYSTEM:  Cranial Nerves 2-12 intact [  ] Abnormal  [  ]  ROM: WFL all extremities [  ]  Abnormal [x  ]limited lle  Motor Strength: WFL all extremities  [  ]  Abnormal [x  ]limited lle  Sensation: intact to light touch [x  ] Abnormal [  ]  Reflexes: Symmetric [  ]  Abnormal [  ]    FUNCTIONAL STATUS:  Bed Mobility: Independent [  ]  Supervision [  ]  Needs Assistance [x  ]  N/A [  ]  Transfers: Independent [  ]  Supervision [  ]  Needs Assistance [ x ]  N/A [  ]   Ambulation: Independent [  ]  Supervision [  ]  Needs Assistance [  ]  N/A [  ]  ADL: Independent [  ] Requires Assistance [  ] N/A [  ]      LABS:                        11.3   6.63  )-----------( 140      ( 09 Jan 2019 06:15 )             34.0     01-08    141  |  103  |  32<H>  ----------------------------<  134<H>  4.7   |  19  |  1.6<H>    Ca    9.3      08 Jan 2019 19:30    TPro  7.1  /  Alb  4.5  /  TBili  0.5  /  DBili  x   /  AST  43<H>  /  ALT  26  /  AlkPhos  84  01-08    PT/INR - ( 08 Jan 2019 20:00 )   PT: 14.30 sec;   INR: 1.25 ratio         PTT - ( 08 Jan 2019 20:00 )  PTT:31.0 sec      RADIOLOGY & ADDITIONAL STUDIES:    Assesment:

## 2019-01-09 NOTE — CONSULT NOTE ADULT - SUBJECTIVE AND OBJECTIVE BOX
HPI: Ms. Gonzalez is an 89 y/o F who states she fell from standing yesterday. Denies hitting her head or losing consciousness Complains of pain in her left groin. Denies pain elsewhere. Denies pain in shoulder.     PAST MEDICAL & SURGICAL HISTORY:  AF (atrial fibrillation)  HLD (hyperlipidemia)  HTN (hypertension)  Acute cerebrovascular accident (CVA)    Allergies    No Known Allergies    Intolerances    Vital Signs Last 24 Hrs  T(C): 36.2 (09 Jan 2019 05:45), Max: 36.7 (08 Jan 2019 18:33)  T(F): 97.1 (09 Jan 2019 05:45), Max: 98 (08 Jan 2019 18:33)  HR: 69 (09 Jan 2019 05:45) (69 - 89)  BP: 146/65 (09 Jan 2019 05:45) (146/65 - 180/78)  BP(mean): 105 (09 Jan 2019 00:10) (105 - 105)  RR: 17 (09 Jan 2019 05:45) (16 - 18)  SpO2: 98% (09 Jan 2019 00:10) (97% - 99%)    PE:  NAD  Respirations non labored  Appropriate mood and affect    LLE:  Skin intact  TTP in groin  Non ttp rest of extremity  SILT T/Dp/Sp  EHL/FHL/TA/GS motor intact  2+ DP pulse    LUE:  Non ttp at coracoid  Non ttp rest of extremity  AIN/PIN/U motor intact  SILT M/R/U  2+ R pulse  Skin intact    Imaging: CT demonstrates a nondisplaced left inferior pubic rami fracture. In addition - questionable non displaced left coracoid fracture visualized                          11.3   6.63  )-----------( 140      ( 09 Jan 2019 06:15 )             34.0     A/P  89 y/o F with L inferior pubic rami fracture  -WBAT LLE  -Radiologic finding at coracoid seems old given negative exam- WBAT LUE  -analgesia  -PT  -No acute orthopedic intervention

## 2019-01-09 NOTE — CONSULT NOTE ADULT - ASSESSMENT
IMPRESSION: Rehab of left pelvic fx    PRECAUTIONS: [  ] Cardiac  [  ] Respiratory  [  ] Seizures [  ] Contact Isolation  [  ] Droplet Isolation  [  ] Other    Weight Bearing Status: wbat    RECOMMENDATION:    Out of Bed to Chair     DVT/Decubiti Prophylaxis    REHAB PLAN:     [ x  ] Bedside P/T 3-5 times a week   [   ]   Bedside O/T  2-3 times a week             [   ] No Rehab Therapy Indicated                   [   ]  Speech Therapy   Conditioning/ROM                                    ADL  Bed Mobility                                               Conditioning/ROM  Transfers                                                     Bed Mobility  Sitting /Standing Balance                         Transfers                                        Gait Training                                               Sitting/Standing Balance  Stair Training [   ]Applicable                    Home equipment Eval                                                                        Splinting  [   ] Only      GOALS:   ADL   [   ]   Independent                    Transfers  [ x  ] Independent                          Ambulation  [ x  ] Independent     [ x   ] With device                            [   ]  CG                                                         [   ]  CG                                                                  [   ] CG                            [    ] Min A                                                   [   ] Min A                                                              [   ] Min  A          DISCHARGE PLAN:   [   ]  Good candidate for Intensive Rehabilitation/Hospital based                                             Will tolerate 3hrs Intensive Rehab Daily                                       [    ]  Short Term Rehab in Skilled Nursing Facility                                       [    ]  Home with Outpatient or VN services                                         [  x  ]  Possible Candidate for Intensive Hospital based Rehab

## 2019-01-10 ENCOUNTER — INPATIENT (INPATIENT)
Facility: HOSPITAL | Age: 84
LOS: 7 days | Discharge: HOME HEALTH | End: 2019-01-18
Attending: PHYSICAL MEDICINE & REHABILITATION | Admitting: PHYSICAL MEDICINE & REHABILITATION
Payer: MEDICARE

## 2019-01-10 VITALS
HEART RATE: 70 BPM | RESPIRATION RATE: 18 BRPM | SYSTOLIC BLOOD PRESSURE: 140 MMHG | TEMPERATURE: 96 F | DIASTOLIC BLOOD PRESSURE: 64 MMHG

## 2019-01-10 LAB
ANION GAP SERPL CALC-SCNC: 15 MMOL/L — HIGH (ref 7–14)
APTT BLD: 31.6 SEC — SIGNIFICANT CHANGE UP (ref 27–39.2)
BASOPHILS # BLD AUTO: 0.03 K/UL — SIGNIFICANT CHANGE UP (ref 0–0.2)
BASOPHILS NFR BLD AUTO: 0.6 % — SIGNIFICANT CHANGE UP (ref 0–1)
BUN SERPL-MCNC: 23 MG/DL — HIGH (ref 10–20)
CALCIUM SERPL-MCNC: 8.5 MG/DL — SIGNIFICANT CHANGE UP (ref 8.5–10.1)
CHLORIDE SERPL-SCNC: 109 MMOL/L — SIGNIFICANT CHANGE UP (ref 98–110)
CO2 SERPL-SCNC: 20 MMOL/L — SIGNIFICANT CHANGE UP (ref 17–32)
CREAT SERPL-MCNC: 1.3 MG/DL — SIGNIFICANT CHANGE UP (ref 0.7–1.5)
EOSINOPHIL # BLD AUTO: 0.25 K/UL — SIGNIFICANT CHANGE UP (ref 0–0.7)
EOSINOPHIL NFR BLD AUTO: 5.2 % — SIGNIFICANT CHANGE UP (ref 0–8)
GLUCOSE SERPL-MCNC: 105 MG/DL — HIGH (ref 70–99)
HCT VFR BLD CALC: 31.6 % — LOW (ref 37–47)
HGB BLD-MCNC: 10.4 G/DL — LOW (ref 12–16)
IMM GRANULOCYTES NFR BLD AUTO: 0.6 % — HIGH (ref 0.1–0.3)
INR BLD: 1.52 RATIO — HIGH (ref 0.65–1.3)
LYMPHOCYTES # BLD AUTO: 0.76 K/UL — LOW (ref 1.2–3.4)
LYMPHOCYTES # BLD AUTO: 15.8 % — LOW (ref 20.5–51.1)
MAGNESIUM SERPL-MCNC: 1.8 MG/DL — SIGNIFICANT CHANGE UP (ref 1.8–2.4)
MCHC RBC-ENTMCNC: 29 PG — SIGNIFICANT CHANGE UP (ref 27–31)
MCHC RBC-ENTMCNC: 32.9 G/DL — SIGNIFICANT CHANGE UP (ref 32–37)
MCV RBC AUTO: 88 FL — SIGNIFICANT CHANGE UP (ref 81–99)
MONOCYTES # BLD AUTO: 0.51 K/UL — SIGNIFICANT CHANGE UP (ref 0.1–0.6)
MONOCYTES NFR BLD AUTO: 10.6 % — HIGH (ref 1.7–9.3)
NEUTROPHILS # BLD AUTO: 3.22 K/UL — SIGNIFICANT CHANGE UP (ref 1.4–6.5)
NEUTROPHILS NFR BLD AUTO: 67.2 % — SIGNIFICANT CHANGE UP (ref 42.2–75.2)
NRBC # BLD: 0 /100 WBCS — SIGNIFICANT CHANGE UP (ref 0–0)
PHOSPHATE SERPL-MCNC: 3.3 MG/DL — SIGNIFICANT CHANGE UP (ref 2.1–4.9)
PLATELET # BLD AUTO: 107 K/UL — LOW (ref 130–400)
POTASSIUM SERPL-MCNC: 3.4 MMOL/L — LOW (ref 3.5–5)
POTASSIUM SERPL-SCNC: 3.4 MMOL/L — LOW (ref 3.5–5)
PROTHROM AB SERPL-ACNC: 17.4 SEC — HIGH (ref 9.95–12.87)
RBC # BLD: 3.59 M/UL — LOW (ref 4.2–5.4)
RBC # FLD: 15.1 % — HIGH (ref 11.5–14.5)
SODIUM SERPL-SCNC: 144 MMOL/L — SIGNIFICANT CHANGE UP (ref 135–146)
WBC # BLD: 4.8 K/UL — SIGNIFICANT CHANGE UP (ref 4.8–10.8)
WBC # FLD AUTO: 4.8 K/UL — SIGNIFICANT CHANGE UP (ref 4.8–10.8)

## 2019-01-10 PROCEDURE — 99232 SBSQ HOSP IP/OBS MODERATE 35: CPT

## 2019-01-10 RX ORDER — POTASSIUM CHLORIDE 20 MEQ
20 PACKET (EA) ORAL ONCE
Qty: 0 | Refills: 0 | Status: COMPLETED | OUTPATIENT
Start: 2019-01-10 | End: 2019-01-10

## 2019-01-10 RX ORDER — WARFARIN SODIUM 2.5 MG/1
5 TABLET ORAL ONCE
Qty: 0 | Refills: 0 | Status: DISCONTINUED | OUTPATIENT
Start: 2019-01-10 | End: 2019-01-10

## 2019-01-10 RX ORDER — HYDRALAZINE HCL 50 MG
10 TABLET ORAL
Qty: 0 | Refills: 0 | COMMUNITY

## 2019-01-10 RX ORDER — IBUPROFEN 200 MG
1 TABLET ORAL
Qty: 0 | Refills: 0 | COMMUNITY
Start: 2019-01-10

## 2019-01-10 RX ORDER — CHLORHEXIDINE GLUCONATE 213 G/1000ML
1 SOLUTION TOPICAL
Qty: 0 | Refills: 0 | Status: DISCONTINUED | OUTPATIENT
Start: 2019-01-10 | End: 2019-01-18

## 2019-01-10 RX ORDER — LOSARTAN POTASSIUM 100 MG/1
100 TABLET, FILM COATED ORAL DAILY
Qty: 0 | Refills: 0 | Status: DISCONTINUED | OUTPATIENT
Start: 2019-01-10 | End: 2019-01-18

## 2019-01-10 RX ORDER — WARFARIN SODIUM 2.5 MG/1
5 TABLET ORAL ONCE
Qty: 0 | Refills: 0 | Status: COMPLETED | OUTPATIENT
Start: 2019-01-10 | End: 2019-01-10

## 2019-01-10 RX ORDER — SENNA PLUS 8.6 MG/1
1 TABLET ORAL AT BEDTIME
Qty: 0 | Refills: 0 | Status: DISCONTINUED | OUTPATIENT
Start: 2019-01-10 | End: 2019-01-18

## 2019-01-10 RX ORDER — CHOLECALCIFEROL (VITAMIN D3) 125 MCG
5000 CAPSULE ORAL DAILY
Qty: 0 | Refills: 0 | Status: DISCONTINUED | OUTPATIENT
Start: 2019-01-10 | End: 2019-01-18

## 2019-01-10 RX ORDER — ACETAMINOPHEN 500 MG
2 TABLET ORAL
Qty: 0 | Refills: 0 | COMMUNITY
Start: 2019-01-10

## 2019-01-10 RX ORDER — DOCUSATE SODIUM 100 MG
100 CAPSULE ORAL
Qty: 0 | Refills: 0 | Status: DISCONTINUED | OUTPATIENT
Start: 2019-01-10 | End: 2019-01-18

## 2019-01-10 RX ORDER — MIRTAZAPINE 45 MG/1
15 TABLET, ORALLY DISINTEGRATING ORAL AT BEDTIME
Qty: 0 | Refills: 0 | Status: DISCONTINUED | OUTPATIENT
Start: 2019-01-10 | End: 2019-01-18

## 2019-01-10 RX ORDER — AMLODIPINE BESYLATE 2.5 MG/1
5 TABLET ORAL DAILY
Qty: 0 | Refills: 0 | Status: DISCONTINUED | OUTPATIENT
Start: 2019-01-10 | End: 2019-01-18

## 2019-01-10 RX ORDER — DEXLANSOPRAZOLE 30 MG/1
60 CAPSULE, DELAYED RELEASE ORAL
Qty: 0 | Refills: 0 | COMMUNITY

## 2019-01-10 RX ORDER — PANTOPRAZOLE SODIUM 20 MG/1
40 TABLET, DELAYED RELEASE ORAL
Qty: 0 | Refills: 0 | Status: DISCONTINUED | OUTPATIENT
Start: 2019-01-10 | End: 2019-01-18

## 2019-01-10 RX ORDER — OXYCODONE HYDROCHLORIDE 5 MG/1
5 TABLET ORAL EVERY 8 HOURS
Qty: 0 | Refills: 0 | Status: DISCONTINUED | OUTPATIENT
Start: 2019-01-10 | End: 2019-01-17

## 2019-01-10 RX ORDER — FERROUS SULFATE 325(65) MG
325 TABLET ORAL DAILY
Qty: 0 | Refills: 0 | Status: DISCONTINUED | OUTPATIENT
Start: 2019-01-10 | End: 2019-01-18

## 2019-01-10 RX ORDER — POTASSIUM CHLORIDE 20 MEQ
40 PACKET (EA) ORAL ONCE
Qty: 0 | Refills: 0 | Status: COMPLETED | OUTPATIENT
Start: 2019-01-10 | End: 2019-01-10

## 2019-01-10 RX ORDER — POTASSIUM CHLORIDE 20 MEQ
20 PACKET (EA) ORAL
Qty: 0 | Refills: 0 | Status: DISCONTINUED | OUTPATIENT
Start: 2019-01-10 | End: 2019-01-10

## 2019-01-10 RX ORDER — WARFARIN SODIUM 2.5 MG/1
1 TABLET ORAL
Qty: 0 | Refills: 0 | COMMUNITY
Start: 2019-01-10

## 2019-01-10 RX ORDER — PANTOPRAZOLE SODIUM 20 MG/1
40 TABLET, DELAYED RELEASE ORAL
Qty: 0 | Refills: 0 | COMMUNITY
Start: 2019-01-10

## 2019-01-10 RX ORDER — IBUPROFEN 200 MG
400 TABLET ORAL EVERY 8 HOURS
Qty: 0 | Refills: 0 | Status: DISCONTINUED | OUTPATIENT
Start: 2019-01-10 | End: 2019-01-10

## 2019-01-10 RX ORDER — ACETAMINOPHEN 500 MG
650 TABLET ORAL EVERY 6 HOURS
Qty: 0 | Refills: 0 | Status: DISCONTINUED | OUTPATIENT
Start: 2019-01-10 | End: 2019-01-18

## 2019-01-10 RX ORDER — METOPROLOL TARTRATE 50 MG
100 TABLET ORAL EVERY 12 HOURS
Qty: 0 | Refills: 0 | Status: DISCONTINUED | OUTPATIENT
Start: 2019-01-10 | End: 2019-01-18

## 2019-01-10 RX ADMIN — SENNA PLUS 1 TABLET(S): 8.6 TABLET ORAL at 21:23

## 2019-01-10 RX ADMIN — MIRTAZAPINE 15 MILLIGRAM(S): 45 TABLET, ORALLY DISINTEGRATING ORAL at 21:23

## 2019-01-10 RX ADMIN — Medication 20 MILLIEQUIVALENT(S): at 10:13

## 2019-01-10 RX ADMIN — Medication 400 MILLIGRAM(S): at 15:37

## 2019-01-10 RX ADMIN — Medication 650 MILLIGRAM(S): at 12:18

## 2019-01-10 RX ADMIN — Medication 40 MILLIEQUIVALENT(S): at 12:46

## 2019-01-10 RX ADMIN — AMLODIPINE BESYLATE 5 MILLIGRAM(S): 2.5 TABLET ORAL at 05:43

## 2019-01-10 RX ADMIN — Medication 5000 UNIT(S): at 12:18

## 2019-01-10 RX ADMIN — PANTOPRAZOLE SODIUM 40 MILLIGRAM(S): 20 TABLET, DELAYED RELEASE ORAL at 12:19

## 2019-01-10 RX ADMIN — WARFARIN SODIUM 5 MILLIGRAM(S): 2.5 TABLET ORAL at 21:23

## 2019-01-10 RX ADMIN — Medication 50 MILLIEQUIVALENT(S): at 06:10

## 2019-01-10 RX ADMIN — Medication 325 MILLIGRAM(S): at 14:28

## 2019-01-10 RX ADMIN — Medication 400 MILLIGRAM(S): at 05:44

## 2019-01-10 RX ADMIN — Medication 100 MILLIGRAM(S): at 05:43

## 2019-01-10 RX ADMIN — Medication 650 MILLIGRAM(S): at 23:58

## 2019-01-10 RX ADMIN — LOSARTAN POTASSIUM 100 MILLIGRAM(S): 100 TABLET, FILM COATED ORAL at 05:43

## 2019-01-10 RX ADMIN — Medication 10 MILLIGRAM(S): at 15:55

## 2019-01-10 RX ADMIN — Medication 10 MILLIGRAM(S): at 05:43

## 2019-01-10 RX ADMIN — Medication 650 MILLIGRAM(S): at 05:44

## 2019-01-10 RX ADMIN — Medication 2.5 MILLIGRAM(S): at 06:11

## 2019-01-10 RX ADMIN — Medication 650 MILLIGRAM(S): at 05:43

## 2019-01-10 RX ADMIN — Medication 650 MILLIGRAM(S): at 23:02

## 2019-01-10 NOTE — DISCHARGE NOTE ADULT - PLAN OF CARE
Complete recovery 1. Continue using all home medications. Use Tylenols for mild pain.  2. Continue regular diet and fluid intake as tolerated.  3. Ambulate with physical therapy and use incentive spirometer (10x/hour) when awake.  4. Shower is okay to take.  5. Do not lift heavy weight (10 lbs or more) for 4-6 weeks.  6. Follow-up with Dr. Briceno his Clinic in 2 weeks and call [phone] to schedule your appointment.  7. In case of any worsening of symptoms/signs, please go to nearby Emergency Department or call [phone].

## 2019-01-10 NOTE — PROGRESS NOTE ADULT - SUBJECTIVE AND OBJECTIVE BOX
Progress Note: General Surgery  Patient: TERESITA MURPHY , 88y (18-Sep-1930)Female   MRN: [id number]  Location: [address]  Visit: 01-08-19 Inpatient  Date: 01-10-19 @ 10:14  Hospital Day: 3    Procedure/Diagnosis: s/p fall with left coracoid fracture nondisplaced, and left inferior pubic rami fracture nondisplaced  Events over 24h: No acute event overnight. Pt is vitally stable. On regular diet and tolerating well, denies nausea, vomiting and/or abdominal pain. Ambulating adequately. Pain is adequately controlled. Using incentive spirometer.     Vitals: T(F): 96.5 (01-10-19 @ 04:00), Max: 96.8 (01-09-19 @ 13:25)  HR: 70 (01-10-19 @ 04:00)  BP: 117/57 (01-10-19 @ 04:00) (117/57 - 165/67)  RR: 18 (01-10-19 @ 04:00)    Diet: Diet, Regular:   Low Sodium (01-09-19 @ 10:08)    IV Fluids: yes no , Type: cholecalciferol 5000 Unit(s) Oral every week  ferrous    sulfate 325 milliGRAM(s) Oral daily    Bowel Function: Bowel movement [  ] Flatus [  ]   Intake and Output:   01-09-19 @ 07:01  -  01-10-19 @ 07:00  --------------------------------------------------------  IN: 150 mL       01-09-19 @ 07:01  -  01-10-19 @ 07:00  --------------------------------------------------------  OUT:  Total OUT: 0 mL        01-09-19 @ 07:01  -  01-10-19 @ 07:00  --------------------------------------------------------  NET: 150 mL    Physical Examination:  General Appearance: NAD, alert and cooperative  HEENT: NCAT, WNL  Heart: S1 and S2. No murmurs.   Lungs: Clear to auscultation BL  Abdomen:  Positive bowel sounds. Soft, nondistended, nontender.   Neuro: Grossly intact.  Skin: Warm/dry, Normal color, No jaundice.     Medications: [Standing]  acetaminophen   Tablet .. 650 milliGRAM(s) Oral every 6 hours  amLODIPine   Tablet 5 milliGRAM(s) Oral daily  cholecalciferol 5000 Unit(s) Oral every week  docusate sodium 100 milliGRAM(s) Oral two times a day  ferrous    sulfate 325 milliGRAM(s) Oral daily  hydrALAZINE 10 milliGRAM(s) Oral three times a day  ibuprofen  Tablet. 400 milliGRAM(s) Oral every 8 hours  losartan 100 milliGRAM(s) Oral daily  metoprolol tartrate 100 milliGRAM(s) Oral two times a day  mirtazapine 15 milliGRAM(s) Oral at bedtime  pantoprazole  Injectable 40 milliGRAM(s) IV Push daily  predniSONE   Tablet 2.5 milliGRAM(s) Oral daily  senna 1 Tablet(s) Oral at bedtime    DVT Prophylaxis:   GI Prophylaxis: pantoprazole  Injectable 40 milliGRAM(s) IV Push daily    Antibiotics:   Anticoagulation:   Medications:[PRN]  morphine  - Injectable 1 milliGRAM(s) IV Push every 4 hours PRN  ondansetron Injectable 4 milliGRAM(s) IV Push every 6 hours PRN  oxyCODONE    IR 5 milliGRAM(s) Oral every 6 hours PRN  polyethylene glycol 3350 17 Gram(s) Oral daily PRN    Labs:                        10.4   4.80  )-----------( 107      ( 10 Vito 2019 00:32 )             31.6   01-10    144  |  109  |  23<H>  ----------------------------<  105<H>  3.4<L>   |  20  |  1.3    Ca    8.5      10 Vito 2019 00:32  Phos  3.3     01-10  Mg     1.8     01-10    TPro  7.1  /  Alb  4.5  /  TBili  0.5  /  DBili  x   /  AST  43<H>  /  ALT  26  /  AlkPhos  84  01-08  LIVER FUNCTIONS - ( 08 Jan 2019 19:30 )  Alb: 4.5 g/dL / Pro: 7.1 g/dL / ALK PHOS: 84 U/L / ALT: 26 U/L / AST: 43 U/L / GGT: x         PT/INR - ( 10 Vito 2019 00:32 )   PT: 17.40 sec;   INR: 1.52 ratio         PTT - ( 10 Vito 2019 00:32 )  PTT:31.6 secCARDIAC MARKERS ( 08 Jan 2019 19:30 )  x     / <0.01 ng/mL / 62 U/L / x     / x          Urine/Micro:    Imaging:  None/24h

## 2019-01-10 NOTE — DISCHARGE NOTE ADULT - HOSPITAL COURSE
On 1/8, 88y old female brought in by EMS after being found by her daughter s/p fall down ~15 steps; +HT, -LOC, +AC (coumadin). Daughter-in-law did not witness fall but heard the impact and a shout. GCS 15/15.  Patient has small laceration to posterior scalp, primary survey is negative otherwise. FAST negative. Patient denies nausea/vomiting, denies LOC, denies chest pain or SOB. Imaging scan showed left coracoid fracture nondisplaced, and left inferior pubic rami fracture nondisplaced. Orthopedics was consulted and they recommended WBAT LLE and as per orthopedics radiologic finding at coracoid seems old given negative exam and recommended WBAT LUE. Pshitary recommended in pt rehab and pt is going to [facility] for rehab.

## 2019-01-10 NOTE — DISCHARGE NOTE ADULT - CARE PROVIDER_API CALL
Angelo Briceno), Orthopaedic Surgery Surgery  [address] [place] [address]  Phone: [phone]  Fax: [phone]

## 2019-01-10 NOTE — DISCHARGE NOTE ADULT - MEDICATION SUMMARY - MEDICATIONS TO TAKE
I will START or STAY ON the medications listed below when I get home from the hospital:    Apriso 0.375 g oral capsule, extended release  -- 1 cap(s) by mouth 2 times a day  -- Indication: For s/p Colon CA    predniSONE 2.5 mg oral tablet  -- 1 tab(s) by mouth once a day  -- Indication: For Steroid    acetaminophen 325 mg oral tablet  -- 2 tab(s) by mouth every 6 hours  -- Indication: For Pain    ibuprofen 400 mg oral tablet  -- 1 tab(s) by mouth every 8 hours  -- Indication: For Pain    losartan 100 mg oral tablet  -- 1 tab(s) by mouth once a day  -- Indication: For HTN (hypertension)    warfarin 5 mg oral tablet  -- 1 tab(s) by mouth once  -- Indication: For AF (atrial fibrillation)    mirtazapine  -- 15 milligram(s) by mouth once a day (at bedtime)  -- Indication: For Depression    metoprolol  -- 100 milligram(s) by mouth 2 times a day  -- Indication: For HTN (hypertension)    Norvasc 5 mg oral tablet  -- 1 tab(s) by mouth once a day  -- Indication: For HTN (hypertension)    ferrous sulfate 325 mg (65 mg elemental iron) oral tablet  -- 1 tab(s) by mouth once a day  -- Indication: For Minerals    Senna 8.6 mg oral tablet  -- 1 tab(s) by mouth once a day (at bedtime)  -- Indication: For Constipation    Dexilant  -- 60 milligram(s) by mouth once a day  -- Indication: For Stomach Upset    hydrALAZINE  -- 10 milligram(s) by mouth 3 times a day  -- Indication: For HTN (hypertension)    Vitamin D3 5000 intl units oral tablet  -- 5000 unit(s) by mouth once a week  -- Indication: For Vitamins

## 2019-01-10 NOTE — PROGRESS NOTE ADULT - ASSESSMENT
Assessment:  88y Female patient admitted s/p fall with left coracoid fracture nondisplaced, and left inferior pubic rami fracture nondisplaced, with the above physical exam, labs, and imaging findings.    Plan:  -WBAT LLE  -WBAT LUE  -PT/OT  -Pain Control as needed  -Encourage ambulation and Incentive Spirometer (10x/hour when awake) use  -Continue GI and DVT prophylaxis  -Strict Input and output monitoring      Date/Time: 01-10-19 @ 10:14

## 2019-01-10 NOTE — DISCHARGE NOTE ADULT - CARE PLAN
Principal Discharge DX:	Fracture of inferior pubic ramus  Goal:	Complete recovery  Assessment and plan of treatment:	1. Continue using all home medications. Use Tylenols for mild pain.  2. Continue regular diet and fluid intake as tolerated.  3. Ambulate with physical therapy and use incentive spirometer (10x/hour) when awake.  4. Shower is okay to take.  5. Do not lift heavy weight (10 lbs or more) for 4-6 weeks.  6. Follow-up with Dr. Briceno his Clinic in 2 weeks and call [phone] to schedule your appointment.  7. In case of any worsening of symptoms/signs, please go to nearby Emergency Department or call [phone].

## 2019-01-10 NOTE — DISCHARGE NOTE ADULT - PATIENT PORTAL LINK FT
You can access the [facility] Patient Portal, offered by [facility], by registering with the following website: [email or web address]

## 2019-01-10 NOTE — DISCHARGE NOTE ADULT - ADDITIONAL INSTRUCTIONS
1. Continue using all home medications. Use Tylenols for mild pain.  2. Continue regular diet and fluid intake as tolerated.  3. Ambulate with physical therapy and use incentive spirometer (10x/hour) when awake.  4. Shower is okay to take.  5. Do not lift heavy weight (10 lbs or more) for 4-6 weeks.  6. Follow-up with Dr. Briceno his Clinic in 2 weeks and call [phone] to schedule your appointment.  7. In case of any worsening of symptoms/signs, please go to nearby Emergency Department or call [phone].

## 2019-01-11 PROBLEM — E78.5 HYPERLIPIDEMIA, UNSPECIFIED: Chronic | Status: ACTIVE | Noted: 2019-01-08

## 2019-01-11 PROBLEM — I63.9 CEREBRAL INFARCTION, UNSPECIFIED: Chronic | Status: ACTIVE | Noted: 2019-01-08

## 2019-01-11 PROBLEM — I10 ESSENTIAL (PRIMARY) HYPERTENSION: Chronic | Status: ACTIVE | Noted: 2019-01-08

## 2019-01-11 PROBLEM — I48.91 UNSPECIFIED ATRIAL FIBRILLATION: Chronic | Status: ACTIVE | Noted: 2019-01-08

## 2019-01-11 LAB
24R-OH-CALCIDIOL SERPL-MCNC: 31 NG/ML — SIGNIFICANT CHANGE UP (ref 30–80)
ALBUMIN SERPL ELPH-MCNC: 3.5 G/DL — SIGNIFICANT CHANGE UP (ref 3.5–5.2)
ALP SERPL-CCNC: 71 U/L — SIGNIFICANT CHANGE UP (ref 30–115)
ALT FLD-CCNC: 28 U/L — SIGNIFICANT CHANGE UP (ref 0–41)
ANION GAP SERPL CALC-SCNC: 15 MMOL/L — HIGH (ref 7–14)
AST SERPL-CCNC: 30 U/L — SIGNIFICANT CHANGE UP (ref 0–41)
BASOPHILS # BLD AUTO: 0.03 K/UL — SIGNIFICANT CHANGE UP (ref 0–0.2)
BASOPHILS NFR BLD AUTO: 0.4 % — SIGNIFICANT CHANGE UP (ref 0–1)
BILIRUB SERPL-MCNC: 0.5 MG/DL — SIGNIFICANT CHANGE UP (ref 0.2–1.2)
BUN SERPL-MCNC: 21 MG/DL — HIGH (ref 10–20)
CALCIUM SERPL-MCNC: 8.7 MG/DL — SIGNIFICANT CHANGE UP (ref 8.5–10.1)
CHLORIDE SERPL-SCNC: 110 MMOL/L — SIGNIFICANT CHANGE UP (ref 98–110)
CO2 SERPL-SCNC: 21 MMOL/L — SIGNIFICANT CHANGE UP (ref 17–32)
CREAT SERPL-MCNC: 1.2 MG/DL — SIGNIFICANT CHANGE UP (ref 0.7–1.5)
EOSINOPHIL # BLD AUTO: 0.23 K/UL — SIGNIFICANT CHANGE UP (ref 0–0.7)
EOSINOPHIL NFR BLD AUTO: 3.1 % — SIGNIFICANT CHANGE UP (ref 0–8)
GLUCOSE SERPL-MCNC: 86 MG/DL — SIGNIFICANT CHANGE UP (ref 70–99)
HCT VFR BLD CALC: 32.7 % — LOW (ref 37–47)
HGB BLD-MCNC: 11.1 G/DL — LOW (ref 12–16)
IMM GRANULOCYTES NFR BLD AUTO: 0.7 % — HIGH (ref 0.1–0.3)
INR BLD: 2.39 RATIO — HIGH (ref 0.65–1.3)
LYMPHOCYTES # BLD AUTO: 0.62 K/UL — LOW (ref 1.2–3.4)
LYMPHOCYTES # BLD AUTO: 8.4 % — LOW (ref 20.5–51.1)
MAGNESIUM SERPL-MCNC: 1.9 MG/DL — SIGNIFICANT CHANGE UP (ref 1.8–2.4)
MCHC RBC-ENTMCNC: 29.7 PG — SIGNIFICANT CHANGE UP (ref 27–31)
MCHC RBC-ENTMCNC: 33.9 G/DL — SIGNIFICANT CHANGE UP (ref 32–37)
MCV RBC AUTO: 87.4 FL — SIGNIFICANT CHANGE UP (ref 81–99)
MONOCYTES # BLD AUTO: 0.57 K/UL — SIGNIFICANT CHANGE UP (ref 0.1–0.6)
MONOCYTES NFR BLD AUTO: 7.7 % — SIGNIFICANT CHANGE UP (ref 1.7–9.3)
NEUTROPHILS # BLD AUTO: 5.88 K/UL — SIGNIFICANT CHANGE UP (ref 1.4–6.5)
NEUTROPHILS NFR BLD AUTO: 79.7 % — HIGH (ref 42.2–75.2)
NRBC # BLD: 0 /100 WBCS — SIGNIFICANT CHANGE UP (ref 0–0)
PLATELET # BLD AUTO: 118 K/UL — LOW (ref 130–400)
POTASSIUM SERPL-MCNC: 4.3 MMOL/L — SIGNIFICANT CHANGE UP (ref 3.5–5)
POTASSIUM SERPL-SCNC: 4.3 MMOL/L — SIGNIFICANT CHANGE UP (ref 3.5–5)
PROT SERPL-MCNC: 5.6 G/DL — LOW (ref 6–8)
PROTHROM AB SERPL-ACNC: 27.2 SEC — HIGH (ref 9.95–12.87)
RBC # BLD: 3.74 M/UL — LOW (ref 4.2–5.4)
RBC # FLD: 15.6 % — HIGH (ref 11.5–14.5)
SODIUM SERPL-SCNC: 146 MMOL/L — SIGNIFICANT CHANGE UP (ref 135–146)
WBC # BLD: 7.38 K/UL — SIGNIFICANT CHANGE UP (ref 4.8–10.8)
WBC # FLD AUTO: 7.38 K/UL — SIGNIFICANT CHANGE UP (ref 4.8–10.8)

## 2019-01-11 RX ORDER — WARFARIN SODIUM 2.5 MG/1
2.5 TABLET ORAL ONCE
Qty: 0 | Refills: 0 | Status: COMPLETED | OUTPATIENT
Start: 2019-01-11 | End: 2019-01-11

## 2019-01-11 RX ADMIN — CHLORHEXIDINE GLUCONATE 1 APPLICATION(S): 213 SOLUTION TOPICAL at 06:10

## 2019-01-11 RX ADMIN — SENNA PLUS 1 TABLET(S): 8.6 TABLET ORAL at 21:26

## 2019-01-11 RX ADMIN — Medication 650 MILLIGRAM(S): at 11:30

## 2019-01-11 RX ADMIN — MIRTAZAPINE 15 MILLIGRAM(S): 45 TABLET, ORALLY DISINTEGRATING ORAL at 21:26

## 2019-01-11 RX ADMIN — OXYCODONE HYDROCHLORIDE 5 MILLIGRAM(S): 5 TABLET ORAL at 02:35

## 2019-01-11 RX ADMIN — Medication 650 MILLIGRAM(S): at 17:53

## 2019-01-11 RX ADMIN — Medication 650 MILLIGRAM(S): at 06:21

## 2019-01-11 RX ADMIN — LOSARTAN POTASSIUM 100 MILLIGRAM(S): 100 TABLET, FILM COATED ORAL at 06:08

## 2019-01-11 RX ADMIN — OXYCODONE HYDROCHLORIDE 5 MILLIGRAM(S): 5 TABLET ORAL at 06:21

## 2019-01-11 RX ADMIN — Medication 100 MILLIGRAM(S): at 06:09

## 2019-01-11 RX ADMIN — Medication 2.5 MILLIGRAM(S): at 11:27

## 2019-01-11 RX ADMIN — PANTOPRAZOLE SODIUM 40 MILLIGRAM(S): 20 TABLET, DELAYED RELEASE ORAL at 06:09

## 2019-01-11 RX ADMIN — Medication 325 MILLIGRAM(S): at 11:26

## 2019-01-11 RX ADMIN — Medication 100 MILLIGRAM(S): at 06:10

## 2019-01-11 RX ADMIN — Medication 650 MILLIGRAM(S): at 06:08

## 2019-01-11 RX ADMIN — Medication 650 MILLIGRAM(S): at 11:28

## 2019-01-11 RX ADMIN — WARFARIN SODIUM 2.5 MILLIGRAM(S): 2.5 TABLET ORAL at 21:26

## 2019-01-11 RX ADMIN — Medication 5000 UNIT(S): at 11:26

## 2019-01-11 RX ADMIN — Medication 100 MILLIGRAM(S): at 17:53

## 2019-01-11 RX ADMIN — AMLODIPINE BESYLATE 5 MILLIGRAM(S): 2.5 TABLET ORAL at 06:20

## 2019-01-12 LAB
INR BLD: 3.1 RATIO — HIGH (ref 0.65–1.3)
PROTHROM AB SERPL-ACNC: 35.3 SEC — HIGH (ref 9.95–12.87)
TSH SERPL-MCNC: 3.65 UIU/ML — SIGNIFICANT CHANGE UP (ref 0.27–4.2)

## 2019-01-12 RX ADMIN — Medication 2.5 MILLIGRAM(S): at 12:28

## 2019-01-12 RX ADMIN — PANTOPRAZOLE SODIUM 40 MILLIGRAM(S): 20 TABLET, DELAYED RELEASE ORAL at 06:21

## 2019-01-12 RX ADMIN — SENNA PLUS 1 TABLET(S): 8.6 TABLET ORAL at 21:42

## 2019-01-12 RX ADMIN — Medication 650 MILLIGRAM(S): at 06:24

## 2019-01-12 RX ADMIN — Medication 650 MILLIGRAM(S): at 17:40

## 2019-01-12 RX ADMIN — MIRTAZAPINE 15 MILLIGRAM(S): 45 TABLET, ORALLY DISINTEGRATING ORAL at 21:42

## 2019-01-12 RX ADMIN — Medication 5000 UNIT(S): at 12:27

## 2019-01-12 RX ADMIN — Medication 100 MILLIGRAM(S): at 06:22

## 2019-01-12 RX ADMIN — Medication 650 MILLIGRAM(S): at 23:38

## 2019-01-12 RX ADMIN — LOSARTAN POTASSIUM 100 MILLIGRAM(S): 100 TABLET, FILM COATED ORAL at 06:22

## 2019-01-12 RX ADMIN — Medication 100 MILLIGRAM(S): at 06:21

## 2019-01-12 RX ADMIN — Medication 650 MILLIGRAM(S): at 06:21

## 2019-01-12 RX ADMIN — Medication 650 MILLIGRAM(S): at 12:29

## 2019-01-12 RX ADMIN — Medication 100 MILLIGRAM(S): at 17:40

## 2019-01-12 RX ADMIN — CHLORHEXIDINE GLUCONATE 1 APPLICATION(S): 213 SOLUTION TOPICAL at 06:24

## 2019-01-12 RX ADMIN — AMLODIPINE BESYLATE 5 MILLIGRAM(S): 2.5 TABLET ORAL at 06:22

## 2019-01-12 RX ADMIN — Medication 650 MILLIGRAM(S): at 19:19

## 2019-01-12 RX ADMIN — Medication 325 MILLIGRAM(S): at 12:28

## 2019-01-13 LAB
INR BLD: 2.43 RATIO — HIGH (ref 0.65–1.3)
PROTHROM AB SERPL-ACNC: 27.7 SEC — HIGH (ref 9.95–12.87)

## 2019-01-13 RX ORDER — WARFARIN SODIUM 2.5 MG/1
2.5 TABLET ORAL ONCE
Qty: 0 | Refills: 0 | Status: COMPLETED | OUTPATIENT
Start: 2019-01-13 | End: 2019-01-13

## 2019-01-13 RX ADMIN — Medication 650 MILLIGRAM(S): at 05:44

## 2019-01-13 RX ADMIN — PANTOPRAZOLE SODIUM 40 MILLIGRAM(S): 20 TABLET, DELAYED RELEASE ORAL at 06:01

## 2019-01-13 RX ADMIN — Medication 5000 UNIT(S): at 11:33

## 2019-01-13 RX ADMIN — LOSARTAN POTASSIUM 100 MILLIGRAM(S): 100 TABLET, FILM COATED ORAL at 05:42

## 2019-01-13 RX ADMIN — Medication 650 MILLIGRAM(S): at 22:00

## 2019-01-13 RX ADMIN — Medication 650 MILLIGRAM(S): at 11:32

## 2019-01-13 RX ADMIN — Medication 650 MILLIGRAM(S): at 05:43

## 2019-01-13 RX ADMIN — CHLORHEXIDINE GLUCONATE 1 APPLICATION(S): 213 SOLUTION TOPICAL at 05:43

## 2019-01-13 RX ADMIN — Medication 100 MILLIGRAM(S): at 17:21

## 2019-01-13 RX ADMIN — AMLODIPINE BESYLATE 5 MILLIGRAM(S): 2.5 TABLET ORAL at 05:42

## 2019-01-13 RX ADMIN — Medication 325 MILLIGRAM(S): at 11:31

## 2019-01-13 RX ADMIN — SENNA PLUS 1 TABLET(S): 8.6 TABLET ORAL at 21:25

## 2019-01-13 RX ADMIN — Medication 2.5 MILLIGRAM(S): at 11:31

## 2019-01-13 RX ADMIN — Medication 100 MILLIGRAM(S): at 05:42

## 2019-01-13 RX ADMIN — WARFARIN SODIUM 2.5 MILLIGRAM(S): 2.5 TABLET ORAL at 21:25

## 2019-01-13 RX ADMIN — MIRTAZAPINE 15 MILLIGRAM(S): 45 TABLET, ORALLY DISINTEGRATING ORAL at 21:25

## 2019-01-14 DIAGNOSIS — Z86.73 PERSONAL HISTORY OF TRANSIENT ISCHEMIC ATTACK (TIA), AND CEREBRAL INFARCTION WITHOUT RESIDUAL DEFICITS: ICD-10-CM

## 2019-01-14 DIAGNOSIS — Z86.19 PERSONAL HISTORY OF OTHER INFECTIOUS AND PARASITIC DISEASES: ICD-10-CM

## 2019-01-14 DIAGNOSIS — I48.91 UNSPECIFIED ATRIAL FIBRILLATION: ICD-10-CM

## 2019-01-14 DIAGNOSIS — Z95.0 PRESENCE OF CARDIAC PACEMAKER: ICD-10-CM

## 2019-01-14 DIAGNOSIS — Z88.0 ALLERGY STATUS TO PENICILLIN: ICD-10-CM

## 2019-01-14 DIAGNOSIS — S01.01XA LACERATION WITHOUT FOREIGN BODY OF SCALP, INITIAL ENCOUNTER: ICD-10-CM

## 2019-01-14 DIAGNOSIS — E78.5 HYPERLIPIDEMIA, UNSPECIFIED: ICD-10-CM

## 2019-01-14 DIAGNOSIS — Z79.01 LONG TERM (CURRENT) USE OF ANTICOAGULANTS: ICD-10-CM

## 2019-01-14 DIAGNOSIS — S42.136A: ICD-10-CM

## 2019-01-14 DIAGNOSIS — Z85.038 PERSONAL HISTORY OF OTHER MALIGNANT NEOPLASM OF LARGE INTESTINE: ICD-10-CM
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
## 2019-01-14 DIAGNOSIS — S32.502A UNSPECIFIED FRACTURE OF LEFT PUBIS, INITIAL ENCOUNTER FOR CLOSED FRACTURE: ICD-10-CM
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
## 2019-01-14 DIAGNOSIS — I10 ESSENTIAL (PRIMARY) HYPERTENSION: ICD-10-CM

## 2019-01-14 LAB
ALBUMIN SERPL ELPH-MCNC: 3.7 G/DL — SIGNIFICANT CHANGE UP (ref 3.5–5.2)
ALP SERPL-CCNC: 71 U/L — SIGNIFICANT CHANGE UP (ref 30–115)
ALT FLD-CCNC: 19 U/L — SIGNIFICANT CHANGE UP (ref 0–41)
ANION GAP SERPL CALC-SCNC: 14 MMOL/L — SIGNIFICANT CHANGE UP (ref 7–14)
AST SERPL-CCNC: 20 U/L — SIGNIFICANT CHANGE UP (ref 0–41)
BASOPHILS # BLD AUTO: 0.05 K/UL — SIGNIFICANT CHANGE UP (ref 0–0.2)
BASOPHILS NFR BLD AUTO: 0.8 % — SIGNIFICANT CHANGE UP (ref 0–1)
BILIRUB SERPL-MCNC: 0.5 MG/DL — SIGNIFICANT CHANGE UP (ref 0.2–1.2)
BUN SERPL-MCNC: 23 MG/DL — HIGH (ref 10–20)
CALCIUM SERPL-MCNC: 9.6 MG/DL — SIGNIFICANT CHANGE UP (ref 8.5–10.1)
CHLORIDE SERPL-SCNC: 106 MMOL/L — SIGNIFICANT CHANGE UP (ref 98–110)
CO2 SERPL-SCNC: 27 MMOL/L — SIGNIFICANT CHANGE UP (ref 17–32)
CREAT SERPL-MCNC: 1.2 MG/DL — SIGNIFICANT CHANGE UP (ref 0.7–1.5)
EOSINOPHIL # BLD AUTO: 0.34 K/UL — SIGNIFICANT CHANGE UP (ref 0–0.7)
EOSINOPHIL NFR BLD AUTO: 5.3 % — SIGNIFICANT CHANGE UP (ref 0–8)
GLUCOSE SERPL-MCNC: 75 MG/DL — SIGNIFICANT CHANGE UP (ref 70–99)
HCT VFR BLD CALC: 35.6 % — LOW (ref 37–47)
HGB BLD-MCNC: 11.8 G/DL — LOW (ref 12–16)
IMM GRANULOCYTES NFR BLD AUTO: 0.9 % — HIGH (ref 0.1–0.3)
INR BLD: 1.76 RATIO — HIGH (ref 0.65–1.3)
LYMPHOCYTES # BLD AUTO: 1.32 K/UL — SIGNIFICANT CHANGE UP (ref 1.2–3.4)
LYMPHOCYTES # BLD AUTO: 20.4 % — LOW (ref 20.5–51.1)
MCHC RBC-ENTMCNC: 29.1 PG — SIGNIFICANT CHANGE UP (ref 27–31)
MCHC RBC-ENTMCNC: 33.1 G/DL — SIGNIFICANT CHANGE UP (ref 32–37)
MCV RBC AUTO: 87.7 FL — SIGNIFICANT CHANGE UP (ref 81–99)
MONOCYTES # BLD AUTO: 0.5 K/UL — SIGNIFICANT CHANGE UP (ref 0.1–0.6)
MONOCYTES NFR BLD AUTO: 7.7 % — SIGNIFICANT CHANGE UP (ref 1.7–9.3)
NEUTROPHILS # BLD AUTO: 4.2 K/UL — SIGNIFICANT CHANGE UP (ref 1.4–6.5)
NEUTROPHILS NFR BLD AUTO: 64.9 % — SIGNIFICANT CHANGE UP (ref 42.2–75.2)
NRBC # BLD: 0 /100 WBCS — SIGNIFICANT CHANGE UP (ref 0–0)
PLATELET # BLD AUTO: 148 K/UL — SIGNIFICANT CHANGE UP (ref 130–400)
POTASSIUM SERPL-MCNC: 4.3 MMOL/L — SIGNIFICANT CHANGE UP (ref 3.5–5)
POTASSIUM SERPL-SCNC: 4.3 MMOL/L — SIGNIFICANT CHANGE UP (ref 3.5–5)
PROT SERPL-MCNC: 6.3 G/DL — SIGNIFICANT CHANGE UP (ref 6–8)
PROTHROM AB SERPL-ACNC: 20.1 SEC — HIGH (ref 9.95–12.87)
RBC # BLD: 4.06 M/UL — LOW (ref 4.2–5.4)
RBC # FLD: 15.3 % — HIGH (ref 11.5–14.5)
SODIUM SERPL-SCNC: 147 MMOL/L — HIGH (ref 135–146)
WBC # BLD: 6.47 K/UL — SIGNIFICANT CHANGE UP (ref 4.8–10.8)
WBC # FLD AUTO: 6.47 K/UL — SIGNIFICANT CHANGE UP (ref 4.8–10.8)

## 2019-01-14 RX ORDER — WARFARIN SODIUM 2.5 MG/1
3.5 TABLET ORAL ONCE
Qty: 0 | Refills: 0 | Status: COMPLETED | OUTPATIENT
Start: 2019-01-14 | End: 2019-01-14

## 2019-01-14 RX ADMIN — OXYCODONE HYDROCHLORIDE 5 MILLIGRAM(S): 5 TABLET ORAL at 21:12

## 2019-01-14 RX ADMIN — Medication 650 MILLIGRAM(S): at 12:28

## 2019-01-14 RX ADMIN — LOSARTAN POTASSIUM 100 MILLIGRAM(S): 100 TABLET, FILM COATED ORAL at 05:50

## 2019-01-14 RX ADMIN — Medication 325 MILLIGRAM(S): at 12:01

## 2019-01-14 RX ADMIN — Medication 100 MILLIGRAM(S): at 05:50

## 2019-01-14 RX ADMIN — SENNA PLUS 1 TABLET(S): 8.6 TABLET ORAL at 21:11

## 2019-01-14 RX ADMIN — AMLODIPINE BESYLATE 5 MILLIGRAM(S): 2.5 TABLET ORAL at 05:50

## 2019-01-14 RX ADMIN — Medication 100 MILLIGRAM(S): at 18:02

## 2019-01-14 RX ADMIN — Medication 2.5 MILLIGRAM(S): at 12:01

## 2019-01-14 RX ADMIN — Medication 5000 UNIT(S): at 12:01

## 2019-01-14 RX ADMIN — Medication 650 MILLIGRAM(S): at 12:01

## 2019-01-14 RX ADMIN — CHLORHEXIDINE GLUCONATE 1 APPLICATION(S): 213 SOLUTION TOPICAL at 05:49

## 2019-01-14 RX ADMIN — PANTOPRAZOLE SODIUM 40 MILLIGRAM(S): 20 TABLET, DELAYED RELEASE ORAL at 06:01

## 2019-01-14 RX ADMIN — Medication 650 MILLIGRAM(S): at 05:51

## 2019-01-14 RX ADMIN — Medication 100 MILLIGRAM(S): at 05:49

## 2019-01-14 RX ADMIN — Medication 100 MILLIGRAM(S): at 18:03

## 2019-01-14 RX ADMIN — WARFARIN SODIUM 3.5 MILLIGRAM(S): 2.5 TABLET ORAL at 21:11

## 2019-01-14 RX ADMIN — Medication 650 MILLIGRAM(S): at 05:50

## 2019-01-14 RX ADMIN — MIRTAZAPINE 15 MILLIGRAM(S): 45 TABLET, ORALLY DISINTEGRATING ORAL at 21:12

## 2019-01-14 RX ADMIN — OXYCODONE HYDROCHLORIDE 5 MILLIGRAM(S): 5 TABLET ORAL at 21:14

## 2019-01-15 LAB
INR BLD: 1.55 RATIO — HIGH (ref 0.65–1.3)
PROTHROM AB SERPL-ACNC: 17.8 SEC — HIGH (ref 9.95–12.87)

## 2019-01-15 RX ORDER — WARFARIN SODIUM 2.5 MG/1
3.5 TABLET ORAL ONCE
Qty: 0 | Refills: 0 | Status: COMPLETED | OUTPATIENT
Start: 2019-01-15 | End: 2019-01-15

## 2019-01-15 RX ADMIN — Medication 650 MILLIGRAM(S): at 05:53

## 2019-01-15 RX ADMIN — Medication 650 MILLIGRAM(S): at 13:13

## 2019-01-15 RX ADMIN — Medication 650 MILLIGRAM(S): at 19:08

## 2019-01-15 RX ADMIN — Medication 325 MILLIGRAM(S): at 13:14

## 2019-01-15 RX ADMIN — Medication 100 MILLIGRAM(S): at 17:01

## 2019-01-15 RX ADMIN — LOSARTAN POTASSIUM 100 MILLIGRAM(S): 100 TABLET, FILM COATED ORAL at 05:52

## 2019-01-15 RX ADMIN — Medication 650 MILLIGRAM(S): at 05:54

## 2019-01-15 RX ADMIN — Medication 650 MILLIGRAM(S): at 13:33

## 2019-01-15 RX ADMIN — OXYCODONE HYDROCHLORIDE 5 MILLIGRAM(S): 5 TABLET ORAL at 21:39

## 2019-01-15 RX ADMIN — MIRTAZAPINE 15 MILLIGRAM(S): 45 TABLET, ORALLY DISINTEGRATING ORAL at 21:36

## 2019-01-15 RX ADMIN — CHLORHEXIDINE GLUCONATE 1 APPLICATION(S): 213 SOLUTION TOPICAL at 05:53

## 2019-01-15 RX ADMIN — SENNA PLUS 1 TABLET(S): 8.6 TABLET ORAL at 21:36

## 2019-01-15 RX ADMIN — Medication 5000 UNIT(S): at 13:14

## 2019-01-15 RX ADMIN — Medication 100 MILLIGRAM(S): at 05:52

## 2019-01-15 RX ADMIN — OXYCODONE HYDROCHLORIDE 5 MILLIGRAM(S): 5 TABLET ORAL at 21:37

## 2019-01-15 RX ADMIN — WARFARIN SODIUM 3.5 MILLIGRAM(S): 2.5 TABLET ORAL at 21:37

## 2019-01-15 RX ADMIN — PANTOPRAZOLE SODIUM 40 MILLIGRAM(S): 20 TABLET, DELAYED RELEASE ORAL at 06:06

## 2019-01-15 RX ADMIN — AMLODIPINE BESYLATE 5 MILLIGRAM(S): 2.5 TABLET ORAL at 05:52

## 2019-01-15 RX ADMIN — Medication 2.5 MILLIGRAM(S): at 13:17

## 2019-01-15 RX ADMIN — Medication 650 MILLIGRAM(S): at 17:01

## 2019-01-16 LAB
INR BLD: 1.64 RATIO — HIGH (ref 0.65–1.3)
PROTHROM AB SERPL-ACNC: 18.8 SEC — HIGH (ref 9.95–12.87)

## 2019-01-16 RX ORDER — WARFARIN SODIUM 2.5 MG/1
3.5 TABLET ORAL ONCE
Qty: 0 | Refills: 0 | Status: COMPLETED | OUTPATIENT
Start: 2019-01-16 | End: 2019-01-16

## 2019-01-16 RX ADMIN — Medication 5000 UNIT(S): at 12:11

## 2019-01-16 RX ADMIN — LOSARTAN POTASSIUM 100 MILLIGRAM(S): 100 TABLET, FILM COATED ORAL at 06:38

## 2019-01-16 RX ADMIN — Medication 650 MILLIGRAM(S): at 12:33

## 2019-01-16 RX ADMIN — CHLORHEXIDINE GLUCONATE 1 APPLICATION(S): 213 SOLUTION TOPICAL at 06:38

## 2019-01-16 RX ADMIN — AMLODIPINE BESYLATE 5 MILLIGRAM(S): 2.5 TABLET ORAL at 06:38

## 2019-01-16 RX ADMIN — Medication 650 MILLIGRAM(S): at 08:34

## 2019-01-16 RX ADMIN — SENNA PLUS 1 TABLET(S): 8.6 TABLET ORAL at 21:20

## 2019-01-16 RX ADMIN — PANTOPRAZOLE SODIUM 40 MILLIGRAM(S): 20 TABLET, DELAYED RELEASE ORAL at 06:38

## 2019-01-16 RX ADMIN — Medication 650 MILLIGRAM(S): at 06:38

## 2019-01-16 RX ADMIN — Medication 650 MILLIGRAM(S): at 17:23

## 2019-01-16 RX ADMIN — Medication 325 MILLIGRAM(S): at 12:10

## 2019-01-16 RX ADMIN — MIRTAZAPINE 15 MILLIGRAM(S): 45 TABLET, ORALLY DISINTEGRATING ORAL at 21:20

## 2019-01-16 RX ADMIN — Medication 100 MILLIGRAM(S): at 06:38

## 2019-01-16 RX ADMIN — Medication 650 MILLIGRAM(S): at 17:22

## 2019-01-16 RX ADMIN — Medication 2.5 MILLIGRAM(S): at 12:11

## 2019-01-16 RX ADMIN — Medication 650 MILLIGRAM(S): at 12:10

## 2019-01-16 RX ADMIN — Medication 100 MILLIGRAM(S): at 17:23

## 2019-01-16 RX ADMIN — WARFARIN SODIUM 3.5 MILLIGRAM(S): 2.5 TABLET ORAL at 21:21

## 2019-01-16 NOTE — DISCHARGE NOTE ADULT - MEDICATION SUMMARY - MEDICATIONS TO TAKE
I will START or STAY ON the medications listed below when I get home from the hospital:    Apriso 0.375 g oral capsule, extended release  -- 1 cap(s) by mouth 2 times a day  -- Indication: For Home med for crohns  disease     predniSONE 2.5 mg oral tablet  -- 1 tab(s) by mouth once a day  -- Indication: For crohns disease, may take this or continue with apriso  as above, take either one at one time     acetaminophen 325 mg oral tablet  -- 2 tab(s) by mouth every 6 hours, As Needed  -- Indication: For Mild pain     ibuprofen 400 mg oral tablet  -- 1 tab(s) by mouth every 8 hours, As Needed  -- Indication: For Mild pain     losartan 100 mg oral tablet  -- 1 tab(s) by mouth once a day  -- Indication: For blood pressure control    warfarin 2.5 mg oral tablet  -- 1 tab(s) by mouth once a day (at bedtime)   -- Indication: For blood thinner     mirtazapine 15 mg oral tablet  -- 1 tab(s) by mouth once a day (at bedtime)  -- Indication: For Mood and sleep     metoprolol tartrate 100 mg oral tablet  -- 1 tab(s) by mouth 2 times a day   -- It is very important that you take or use this exactly as directed.  Do not skip doses or discontinue unless directed by your doctor.  May cause drowsiness.  Alcohol may intensify this effect.  Use care when operating dangerous machinery.  Some non-prescription drugs may aggravate your condition.  Read all labels carefully.  If a warning appears, check with your doctor before taking.  Take with food or milk.  This drug may impair the ability to drive or operate machinery.  Use care until you become familiar with its effects.    -- Indication: For blood pressure control    amLODIPine 5 mg oral tablet  -- 1 tab(s) by mouth once a day  -- Indication: For blood pressure control    ferrous sulfate 325 mg (65 mg elemental iron) oral tablet  -- 1 tab(s) by mouth once a day  -- Indication: For Anemia , tke this for a month and ask advise from your PMD     pantoprazole 40 mg oral delayed release tablet  -- 1 tab(s) by mouth once a day (before a meal)  -- Indication: For you may take this or dexilant for gastric ulcer prevention     cholecalciferol oral tablet  -- 5000 unit(s) by mouth once a day  -- Indication: For vit d supplement

## 2019-01-16 NOTE — DISCHARGE NOTE ADULT - MEDICATION SUMMARY - MEDICATIONS TO STOP TAKING
I will STOP taking the medications listed below when I get home from the hospital:    hydrALAZINE  -- 10 milligram(s) by mouth 3 times a day    Senna 8.6 mg oral tablet  -- 1 tab(s) by mouth once a day (at bedtime)

## 2019-01-16 NOTE — DISCHARGE NOTE ADULT - CARE PROVIDER_API CALL
Angelo Briceno), Orthopaedic Surgery Surgery  [address] [place] [address]  Phone: [phone]  Fax: [phone] Angelo Briceno), Orthopaedic Surgery Surgery  [address] [place] [address]  Phone: [phone]  Fax: [phone]    dr Gunn,   your Pmd  Phone: (   )    -  Fax: (   )    - Angelo Briceno), Orthopaedic Surgery Surgery  [address] [place] [address]  Phone: [phone]  Fax: [phone]    dr Gunn,   your Pmd  Phone: (   )    -  Fax: (   )    -    GI doctor,   your GI doctor  Phone: (   )    -  Fax: (   )    -

## 2019-01-16 NOTE — DISCHARGE NOTE ADULT - MEDICATION SUMMARY - MEDICATIONS TO CHANGE
I will SWITCH the dose or number of times a day I take the medications listed below when I get home from the hospital:    acetaminophen 325 mg oral tablet  -- 2 tab(s) by mouth every 6 hours    ibuprofen 400 mg oral tablet  -- 1 tab(s) by mouth every 8 hours    warfarin 5 mg oral tablet  -- 1 tab(s) by mouth once

## 2019-01-16 NOTE — DISCHARGE NOTE ADULT - REASON FOR ADMISSION
89 yo lady with a fib on Coumadin, mild Crohn's, HTN, old CVA, lives in [place], was visiting with her son and his family since before Mimi, she fell down the stairs 2 days ago and was admitted to [facility] with left pelvic fx (inferior pubic ramus) and old left coracoid fx, also small laceration posterior scalp. She was seen by Ortho Dr. Briceno, SANDY SMITH and PAVEL. 89 yo lady with a fib on Coumadin, mild Crohn's, HTN, old CVA, lives in [place], was visiting with her son and his family since before Mimi, she fell down the stairs 2 days ago and was admitted to [facility] with left pelvic fx (inferior pubic ramus) and old left coracoid fx, also small laceration posterior scalp. She was seen by Ortho Dr. Briceno,  WBYOANA SMITH and PAVEL. she was transferred to rehab on 1/10/19 and she was participating in therapy, she is weight bearing as tolerated to  both legs ,  she will be doing a home draw ( they have machine to check INR) and send results to her Pmd  dr Gunn  in [place]  , she will get home care 89 yo lady with a fib on Coumadin, mild Crohn's, HTN, old CVA, lives in [place], was visiting with her son and his family since before Mimi, she fell down the stairs 2 days ago and was admitted to [facility] with left pelvic fx (inferior pubic ramus) and old left coracoid fx, also small laceration posterior scalp. She was seen by Ortho Dr. Briceno,  she is weight bearing as tolerated to LUIS and PAVEL. she was transferred to rehab on 1/10/19 and she was participating in therapy,  ,  she will be doing a home draw ( they have machine to check INR) and send results to her Pmd  dr Gunn  in [place]  , she will get home care

## 2019-01-16 NOTE — DISCHARGE NOTE ADULT - PROVIDER TOKENS
TOKGIUSEPPE:'[id number]' TOKEN:'[id number]',FREE:[LAST:[dr Gunn],PHONE:[(   )    -],FAX:[(   )    -],ADDRESS:[your Pmd]] TOKEN:'[id number]',FREE:[LAST:[dr Gunn],PHONE:[(   )    -],FAX:[(   )    -],ADDRESS:[your Pmd]],FREE:[LAST:[GI doctor],PHONE:[(   )    -],FAX:[(   )    -],ADDRESS:[your GI doctor]]

## 2019-01-16 NOTE — DISCHARGE NOTE ADULT - CARE PROVIDERS DIRECT ADDRESSES
,[email or web address],DirectAddress_Unknown ,[email or web address],DirectAddress_Unknown,DirectAddress_Unknown

## 2019-01-16 NOTE — DISCHARGE NOTE ADULT - PLAN OF CARE
full healing and be able to ambulate without pain continue physical therapy to prevent further complications continue blood thinner coumadin and INR  monitoring to keep normal blood pressure continue blood pressure med [facility] a follow up continue blood thinner coumadin and INR  monitoring, watch for bleeding anywhere from body,  be consistent with your [place] intake since you are on coumadin, please check INR with your Device and have your doctor advises you  on your coumadin dose , tonight take 2.5 mg  of coumadin please to prevent flare up please continue   your med for Crohn's , at [facility] we gave you prednisone, you may stop pred and continue with your own Apriso , please follow with your GI doctor in 2  weeks

## 2019-01-16 NOTE — DISCHARGE NOTE ADULT - CARE PLAN
Principal Discharge DX:	Multiple closed pelvic fractures without disruption of pelvic ring  Goal:	full healing and be able to ambulate without pain  Assessment and plan of treatment:	continue physical therapy  Secondary Diagnosis:	AF (atrial fibrillation)  Goal:	to prevent further complications  Assessment and plan of treatment:	continue blood thinner coumadin and INR  monitoring  Secondary Diagnosis:	HTN (hypertension)  Goal:	to keep normal blood pressure  Assessment and plan of treatment:	continue blood pressure med [facility] a follow up Principal Discharge DX:	Multiple closed pelvic fractures without disruption of pelvic ring  Goal:	full healing and be able to ambulate without pain  Assessment and plan of treatment:	continue physical therapy  Secondary Diagnosis:	AF (atrial fibrillation)  Goal:	to prevent further complications  Assessment and plan of treatment:	continue blood thinner coumadin and INR  monitoring, watch for bleeding anywhere from body,  be consistent with your Greens intake since you are on coumadin, please check INR with your Device and have your doctor advises you  on your coumadin dose , tonight take 2.5 mg  of coumadin please  Secondary Diagnosis:	HTN (hypertension)  Goal:	to keep normal blood pressure  Assessment and plan of treatment:	continue blood pressure med [facility] a follow up  Secondary Diagnosis:	Crohns disease  Goal:	to prevent flare up  Assessment and plan of treatment:	please continue   your med for Crohn's , at [facility] we gave you prednisone, you may stop pred and continue with your own Apriso , please follow with your GI doctor in 2  weeks

## 2019-01-17 LAB
INR BLD: 1.78 RATIO — HIGH (ref 0.65–1.3)
PROTHROM AB SERPL-ACNC: 20.3 SEC — HIGH (ref 9.95–12.87)

## 2019-01-17 RX ORDER — WARFARIN SODIUM 2.5 MG/1
3.5 TABLET ORAL ONCE
Qty: 0 | Refills: 0 | Status: COMPLETED | OUTPATIENT
Start: 2019-01-17 | End: 2019-01-17

## 2019-01-17 RX ADMIN — PANTOPRAZOLE SODIUM 40 MILLIGRAM(S): 20 TABLET, DELAYED RELEASE ORAL at 06:21

## 2019-01-17 RX ADMIN — Medication 650 MILLIGRAM(S): at 06:20

## 2019-01-17 RX ADMIN — Medication 100 MILLIGRAM(S): at 17:36

## 2019-01-17 RX ADMIN — CHLORHEXIDINE GLUCONATE 1 APPLICATION(S): 213 SOLUTION TOPICAL at 06:21

## 2019-01-17 RX ADMIN — SENNA PLUS 1 TABLET(S): 8.6 TABLET ORAL at 21:29

## 2019-01-17 RX ADMIN — Medication 100 MILLIGRAM(S): at 06:21

## 2019-01-17 RX ADMIN — Medication 650 MILLIGRAM(S): at 17:36

## 2019-01-17 RX ADMIN — MIRTAZAPINE 15 MILLIGRAM(S): 45 TABLET, ORALLY DISINTEGRATING ORAL at 21:32

## 2019-01-17 RX ADMIN — AMLODIPINE BESYLATE 5 MILLIGRAM(S): 2.5 TABLET ORAL at 06:21

## 2019-01-17 RX ADMIN — WARFARIN SODIUM 3.5 MILLIGRAM(S): 2.5 TABLET ORAL at 21:31

## 2019-01-17 RX ADMIN — Medication 650 MILLIGRAM(S): at 12:31

## 2019-01-17 RX ADMIN — Medication 650 MILLIGRAM(S): at 06:25

## 2019-01-17 RX ADMIN — Medication 1000 UNIT(S): at 12:28

## 2019-01-17 RX ADMIN — Medication 325 MILLIGRAM(S): at 12:28

## 2019-01-17 RX ADMIN — LOSARTAN POTASSIUM 100 MILLIGRAM(S): 100 TABLET, FILM COATED ORAL at 06:21

## 2019-01-17 RX ADMIN — Medication 650 MILLIGRAM(S): at 17:37

## 2019-01-18 LAB
ANION GAP SERPL CALC-SCNC: 12 MMOL/L — SIGNIFICANT CHANGE UP (ref 7–14)
BASOPHILS # BLD AUTO: 0.02 K/UL — SIGNIFICANT CHANGE UP (ref 0–0.2)
BASOPHILS NFR BLD AUTO: 0.4 % — SIGNIFICANT CHANGE UP (ref 0–1)
BUN SERPL-MCNC: 26 MG/DL — HIGH (ref 10–20)
CALCIUM SERPL-MCNC: 9.1 MG/DL — SIGNIFICANT CHANGE UP (ref 8.5–10.1)
CHLORIDE SERPL-SCNC: 107 MMOL/L — SIGNIFICANT CHANGE UP (ref 98–110)
CO2 SERPL-SCNC: 29 MMOL/L — SIGNIFICANT CHANGE UP (ref 17–32)
CREAT SERPL-MCNC: 1.2 MG/DL — SIGNIFICANT CHANGE UP (ref 0.7–1.5)
EOSINOPHIL # BLD AUTO: 0.23 K/UL — SIGNIFICANT CHANGE UP (ref 0–0.7)
EOSINOPHIL NFR BLD AUTO: 4.5 % — SIGNIFICANT CHANGE UP (ref 0–8)
GLUCOSE SERPL-MCNC: 76 MG/DL — SIGNIFICANT CHANGE UP (ref 70–99)
HCT VFR BLD CALC: 33 % — LOW (ref 37–47)
HGB BLD-MCNC: 10.7 G/DL — LOW (ref 12–16)
IMM GRANULOCYTES NFR BLD AUTO: 0.6 % — HIGH (ref 0.1–0.3)
INR BLD: 1.96 RATIO — HIGH (ref 0.65–1.3)
LYMPHOCYTES # BLD AUTO: 0.9 K/UL — LOW (ref 1.2–3.4)
LYMPHOCYTES # BLD AUTO: 17.5 % — LOW (ref 20.5–51.1)
MAGNESIUM SERPL-MCNC: 1.9 MG/DL — SIGNIFICANT CHANGE UP (ref 1.8–2.4)
MCHC RBC-ENTMCNC: 28.8 PG — SIGNIFICANT CHANGE UP (ref 27–31)
MCHC RBC-ENTMCNC: 32.4 G/DL — SIGNIFICANT CHANGE UP (ref 32–37)
MCV RBC AUTO: 88.9 FL — SIGNIFICANT CHANGE UP (ref 81–99)
MONOCYTES # BLD AUTO: 0.51 K/UL — SIGNIFICANT CHANGE UP (ref 0.1–0.6)
MONOCYTES NFR BLD AUTO: 9.9 % — HIGH (ref 1.7–9.3)
NEUTROPHILS # BLD AUTO: 3.46 K/UL — SIGNIFICANT CHANGE UP (ref 1.4–6.5)
NEUTROPHILS NFR BLD AUTO: 67.1 % — SIGNIFICANT CHANGE UP (ref 42.2–75.2)
NRBC # BLD: 0 /100 WBCS — SIGNIFICANT CHANGE UP (ref 0–0)
PLATELET # BLD AUTO: 122 K/UL — LOW (ref 130–400)
POTASSIUM SERPL-MCNC: 4.2 MMOL/L — SIGNIFICANT CHANGE UP (ref 3.5–5)
POTASSIUM SERPL-SCNC: 4.2 MMOL/L — SIGNIFICANT CHANGE UP (ref 3.5–5)
PROTHROM AB SERPL-ACNC: 22.4 SEC — HIGH (ref 9.95–12.87)
RBC # BLD: 3.71 M/UL — LOW (ref 4.2–5.4)
RBC # FLD: 15.2 % — HIGH (ref 11.5–14.5)
SODIUM SERPL-SCNC: 148 MMOL/L — HIGH (ref 135–146)
WBC # BLD: 5.15 K/UL — SIGNIFICANT CHANGE UP (ref 4.8–10.8)
WBC # FLD AUTO: 5.15 K/UL — SIGNIFICANT CHANGE UP (ref 4.8–10.8)

## 2019-01-18 RX ORDER — MESALAMINE 400 MG
1 TABLET, DELAYED RELEASE (ENTERIC COATED) ORAL
Qty: 0 | Refills: 0 | COMMUNITY

## 2019-01-18 RX ORDER — MIRTAZAPINE 45 MG/1
15 TABLET, ORALLY DISINTEGRATING ORAL
Qty: 0 | Refills: 0 | COMMUNITY

## 2019-01-18 RX ORDER — METOPROLOL TARTRATE 50 MG
1 TABLET ORAL
Qty: 60 | Refills: 0 | OUTPATIENT
Start: 2019-01-18 | End: 2019-02-16

## 2019-01-18 RX ORDER — AMLODIPINE BESYLATE 2.5 MG/1
1 TABLET ORAL
Qty: 0 | Refills: 0 | COMMUNITY

## 2019-01-18 RX ORDER — METOPROLOL TARTRATE 50 MG
100 TABLET ORAL
Qty: 0 | Refills: 0 | COMMUNITY

## 2019-01-18 RX ORDER — WARFARIN SODIUM 2.5 MG/1
2.5 TABLET ORAL ONCE
Qty: 0 | Refills: 0 | Status: DISCONTINUED | OUTPATIENT
Start: 2019-01-18 | End: 2019-01-18

## 2019-01-18 RX ORDER — WARFARIN SODIUM 2.5 MG/1
1 TABLET ORAL
Qty: 30 | Refills: 0 | OUTPATIENT
Start: 2019-01-18 | End: 2019-02-16

## 2019-01-18 RX ORDER — LOSARTAN POTASSIUM 100 MG/1
1 TABLET, FILM COATED ORAL
Qty: 30 | Refills: 0 | OUTPATIENT
Start: 2019-01-18 | End: 2019-02-16

## 2019-01-18 RX ORDER — FERROUS SULFATE 325(65) MG
1 TABLET ORAL
Qty: 30 | Refills: 0 | OUTPATIENT
Start: 2019-01-18 | End: 2019-02-16

## 2019-01-18 RX ORDER — AMLODIPINE BESYLATE 2.5 MG/1
1 TABLET ORAL
Qty: 30 | Refills: 0 | OUTPATIENT
Start: 2019-01-18 | End: 2019-02-16

## 2019-01-18 RX ORDER — MIRTAZAPINE 45 MG/1
1 TABLET, ORALLY DISINTEGRATING ORAL
Qty: 30 | Refills: 0 | OUTPATIENT
Start: 2019-01-18 | End: 2019-02-16

## 2019-01-18 RX ORDER — SENNA PLUS 8.6 MG/1
1 TABLET ORAL
Qty: 0 | Refills: 0 | COMMUNITY

## 2019-01-18 RX ORDER — PANTOPRAZOLE SODIUM 20 MG/1
1 TABLET, DELAYED RELEASE ORAL
Qty: 30 | Refills: 0 | OUTPATIENT
Start: 2019-01-18 | End: 2019-02-16

## 2019-01-18 RX ORDER — CHOLECALCIFEROL (VITAMIN D3) 125 MCG
5000 CAPSULE ORAL
Qty: 0 | Refills: 0 | COMMUNITY

## 2019-01-18 RX ORDER — CHOLECALCIFEROL (VITAMIN D3) 125 MCG
5000 CAPSULE ORAL
Qty: 0 | Refills: 0 | COMMUNITY
Start: 2019-01-18

## 2019-01-18 RX ORDER — FERROUS SULFATE 325(65) MG
1 TABLET ORAL
Qty: 0 | Refills: 0 | COMMUNITY

## 2019-01-18 RX ORDER — LOSARTAN POTASSIUM 100 MG/1
1 TABLET, FILM COATED ORAL
Qty: 0 | Refills: 0 | COMMUNITY

## 2019-01-18 RX ADMIN — PANTOPRAZOLE SODIUM 40 MILLIGRAM(S): 20 TABLET, DELAYED RELEASE ORAL at 06:06

## 2019-01-18 RX ADMIN — Medication 2.5 MILLIGRAM(S): at 11:15

## 2019-01-18 RX ADMIN — Medication 650 MILLIGRAM(S): at 06:06

## 2019-01-18 RX ADMIN — LOSARTAN POTASSIUM 100 MILLIGRAM(S): 100 TABLET, FILM COATED ORAL at 06:06

## 2019-01-18 RX ADMIN — Medication 650 MILLIGRAM(S): at 11:14

## 2019-01-18 RX ADMIN — Medication 325 MILLIGRAM(S): at 11:15

## 2019-01-18 RX ADMIN — Medication 100 MILLIGRAM(S): at 06:06

## 2019-01-18 RX ADMIN — AMLODIPINE BESYLATE 5 MILLIGRAM(S): 2.5 TABLET ORAL at 06:06

## 2019-01-18 RX ADMIN — Medication 5000 UNIT(S): at 11:14
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
## 2019-01-29 DIAGNOSIS — E78.5 HYPERLIPIDEMIA, UNSPECIFIED: ICD-10-CM

## 2019-01-29 DIAGNOSIS — S01.91XD LACERATION WITHOUT FOREIGN BODY OF UNSPECIFIED PART OF HEAD, SUBSEQUENT ENCOUNTER: ICD-10-CM

## 2019-01-29 DIAGNOSIS — Z86.73 PERSONAL HISTORY OF TRANSIENT ISCHEMIC ATTACK (TIA), AND CEREBRAL INFARCTION WITHOUT RESIDUAL DEFICITS: ICD-10-CM

## 2019-01-29 DIAGNOSIS — I67.9 CEREBROVASCULAR DISEASE, UNSPECIFIED: ICD-10-CM

## 2019-01-29 DIAGNOSIS — I48.91 UNSPECIFIED ATRIAL FIBRILLATION: ICD-10-CM

## 2019-01-29 DIAGNOSIS — Z79.01 LONG TERM (CURRENT) USE OF ANTICOAGULANTS: ICD-10-CM

## 2019-01-29 DIAGNOSIS — K50.90 CROHN'S DISEASE, UNSPECIFIED, WITHOUT COMPLICATIONS: ICD-10-CM

## 2019-01-29 DIAGNOSIS — N18.9 CHRONIC KIDNEY DISEASE, UNSPECIFIED: ICD-10-CM

## 2019-01-29 DIAGNOSIS — K21.9 GASTRO-ESOPHAGEAL REFLUX DISEASE WITHOUT ESOPHAGITIS: ICD-10-CM

## 2019-01-29 DIAGNOSIS — F32.9 MAJOR DEPRESSIVE DISORDER, SINGLE EPISODE, UNSPECIFIED: ICD-10-CM

## 2019-01-29 DIAGNOSIS — S32.592D OTHER SPECIFIED FRACTURE OF LEFT PUBIS, SUBSEQUENT ENCOUNTER FOR FRACTURE WITH ROUTINE HEALING: ICD-10-CM

## 2019-01-29 DIAGNOSIS — D64.9 ANEMIA, UNSPECIFIED: ICD-10-CM

## 2019-01-29 DIAGNOSIS — E87.6 HYPOKALEMIA: ICD-10-CM

## 2019-01-29 DIAGNOSIS — K59.00 CONSTIPATION, UNSPECIFIED: ICD-10-CM

## 2019-01-29 DIAGNOSIS — I12.9 HYPERTENSIVE CHRONIC KIDNEY DISEASE WITH STAGE 1 THROUGH STAGE 4 CHRONIC KIDNEY DISEASE, OR UNSPECIFIED CHRONIC KIDNEY DISEASE: ICD-10-CM

## 2019-01-29 DIAGNOSIS — M85.80 OTHER SPECIFIED DISORDERS OF BONE DENSITY AND STRUCTURE, UNSPECIFIED SITE: ICD-10-CM

## 2019-01-30 ENCOUNTER — OUTPATIENT (OUTPATIENT)
Dept: OUTPATIENT SERVICES | Facility: HOSPITAL | Age: 84
LOS: 1 days | End: 2019-01-30
Payer: MEDICARE

## 2019-01-30 PROCEDURE — 73522 X-RAY EXAM HIPS BI 3-4 VIEWS: CPT | Mod: 26

## 2019-01-30 PROCEDURE — 73522 X-RAY EXAM HIPS BI 3-4 VIEWS: CPT
